# Patient Record
Sex: FEMALE | Race: BLACK OR AFRICAN AMERICAN | Employment: FULL TIME | URBAN - METROPOLITAN AREA
[De-identification: names, ages, dates, MRNs, and addresses within clinical notes are randomized per-mention and may not be internally consistent; named-entity substitution may affect disease eponyms.]

---

## 2017-04-10 ENCOUNTER — OFFICE VISIT (OUTPATIENT)
Dept: FAMILY MEDICINE CLINIC | Age: 57
End: 2017-04-10

## 2017-04-10 VITALS
SYSTOLIC BLOOD PRESSURE: 130 MMHG | OXYGEN SATURATION: 96 % | DIASTOLIC BLOOD PRESSURE: 84 MMHG | HEIGHT: 71 IN | TEMPERATURE: 98.3 F | HEART RATE: 78 BPM | RESPIRATION RATE: 14 BRPM | WEIGHT: 254 LBS | BODY MASS INDEX: 35.56 KG/M2

## 2017-04-10 DIAGNOSIS — G89.29 CHRONIC PAIN OF LEFT KNEE: Primary | ICD-10-CM

## 2017-04-10 DIAGNOSIS — M25.562 CHRONIC PAIN OF LEFT KNEE: Primary | ICD-10-CM

## 2017-04-10 RX ORDER — DICLOFENAC SODIUM 75 MG/1
75 TABLET, DELAYED RELEASE ORAL 2 TIMES DAILY
Qty: 60 TAB | Refills: 5 | Status: SHIPPED | OUTPATIENT
Start: 2017-04-10 | End: 2017-06-21 | Stop reason: ALTCHOICE

## 2017-04-10 RX ORDER — TRAMADOL HYDROCHLORIDE AND ACETAMINOPHEN 37.5; 325 MG/1; MG/1
TABLET ORAL
Qty: 50 TAB | Refills: 3 | Status: SHIPPED | OUTPATIENT
Start: 2017-04-10 | End: 2017-09-05 | Stop reason: SDUPTHER

## 2017-04-10 RX ORDER — METHYLPREDNISOLONE ACETATE 40 MG/ML
40 INJECTION, SUSPENSION INTRA-ARTICULAR; INTRALESIONAL; INTRAMUSCULAR; SOFT TISSUE ONCE
Qty: 1 ML | Refills: 0
Start: 2017-04-10 | End: 2017-04-10

## 2017-04-10 NOTE — PROGRESS NOTES
Chief Complaint   Patient presents with    Knee Pain     Left knee. . pain gets up to a \"5\"   \"feels like fluid on the knee, it's tight\"     1. Have you been to the ER, urgent care clinic since your last visit? Hospitalized since your last visit? No    2. Have you seen or consulted any other health care providers outside of the [address] [place] [address] since your last visit? Include any pap smears or colon screening. No     Health Maintenance Due   Topic Date Due    Hepatitis C Screening  1960       [facility]  OFFICE PROCEDURE PROGRESS NOTE        Chart reviewed for the following:   Travis Núñez LPN, have reviewed the History, Physical and updated the Allergic reactions for [address] [place] [address] performed immediately prior to start of procedure:   Heide KAMINSKI LPN, have performed the following reviews on [facility] prior to the start of the procedure:            * Patient was identified by name and date of birth   * Agreement on procedure being performed was verified  * Risks and Benefits explained to the patient  * Procedure site verified and marked as necessary  * Patient was positioned for comfort  * Consent was signed and verified     Time: 11:31am      Date of procedure: 4/10/2017    Procedure performed by:  Jamar Jackson MD    Provider assisted by: Yasemin Maurice LPN    Patient assisted by: Yasemin Maurice LPN    How tolerated by patient:  Pt tolerated procedure well.     Post Procedural Pain Scale:  Pt had a pain of \"0\" after procedure   Comments: None

## 2017-04-10 NOTE — MR AVS SNAPSHOT
Visit Information Date & Time Provider Department Dept. Phone Encounter #  
 4/10/2017  9:45 AM Fly Mahoney MD [facility] [phone] [id number] Upcoming Health Maintenance Date Due Hepatitis C Screening 1960 PAP AKA CERVICAL CYTOLOGY 4/22/2018 BREAST CANCER SCRN MAMMOGRAM 10/7/2018 COLONOSCOPY 4/25/2024 DTaP/Tdap/Td series (2 - Td) 6/20/2026 Allergies as of 4/10/2017  Review Complete On: 4/10/2017 By: Nighat Nevarez LPN Severity Noted Reaction Type Reactions Nuts [Tree Nut]  04/25/2016    Shortness of Breath Brazil nuts cause asthma type symptoms Current Immunizations  Reviewed on 10/14/2015 Name Date Influenza Vaccine (Quad) PF 9/26/2016, 10/14/2015, 1/22/2015 Influenza Vaccine PF 4/2/2014 Tdap 6/20/2016 Not reviewed this visit You Were Diagnosed With   
  
 Codes Comments Chronic pain of left knee    -  Primary ICD-10-CM: M25.562, K86.80 ICD-9-CM: 719.46, 338.29 Vitals BP Pulse Temp Resp Height(growth percentile) Weight(growth percentile) 130/84 78 98.3 °F (36.8 °C) (Oral) 14 5' 11\" (1.803 m) 254 lb (115.2 kg) LMP SpO2 BMI OB Status Smoking Status 01/13/2012 96% 35.43 kg/m2 Postmenopausal Never Smoker Vitals History BMI and BSA Data Body Mass Index Body Surface Area  
 35.43 kg/m 2 2.4 m 2 Preferred Pharmacy Pharmacy Name Phone [facility]/PHARMACY #[id number]- [place], [place] - [address] [phone] Your Updated Medication List  
  
   
This list is accurate as of: 4/10/17 11:03 AM.  Always use your most recent med list.  
  
  
  
  
 diclofenac EC 75 mg EC tablet Commonly known as:  VOLTAREN Take 1 Tab by mouth two (2) times a day. For arthritis in knee  
  
 levothyroxine 100 mcg tablet Commonly known as:  SYNTHROID  
TAKE 1 TAB BY MOUTH DAILY (BEFORE BREAKFAST). methylPREDNISolone acetate 40 mg/mL injection Commonly known as:  DEPO-Medrol 1 mL by IntraMUSCular route once for 1 dose. MULTIVITAMIN PO Take  by mouth as needed. naltrexone-buPROPion 8-90 mg Tber ER tablet Commonly known as:  Ele Devine Week 1 1 tab PO QAM, Week 2 1QAM 1QHS, Week 3 2QAM 1 QHS, Week 4 & beyond 2QAM 2QHS PROAIR HFA 90 mcg/actuation inhaler Generic drug:  albuterol Take 2 Puffs by inhalation every four (4) hours as needed for Wheezing. traMADol-acetaminophen 37.5-325 mg per tablet Commonly known as:  ULTRACET  
1-2 tabs every 6 hours as needed for knee pain Prescriptions Printed Refills  
 traMADol-acetaminophen (ULTRACET) 37.5-325 mg per tablet 3 Si-2 tabs every 6 hours as needed for knee pain  
 Class: Print  
 naltrexone-buPROPion (CONTRAVE) 8-90 mg TbER ER tablet 5 Sig: Week 1 1 tab PO QAM, Week 2 1QAM 1QHS, Week 3 2QAM 1 QHS, Week 4 & beyond 2QAM 2QHS Class: Print Prescriptions Sent to Pharmacy Refills  
 diclofenac EC (VOLTAREN) 75 mg EC tablet 5 Sig: Take 1 Tab by mouth two (2) times a day. For arthritis in knee Class: Normal  
 Pharmacy: [facility]/pharmacy [address] [place] [address] [address] #: [phone] Route: Oral  
  
We Performed the Following METHYLPREDNISOLONE ACETATE INJECTION 40 MG [ [facility]] Comments:  
 Submit quantity per 40 mg injection ID DRAIN/INJECT LARGE JOINT/BURSA [id number] CPT(R)] [facility] [place] [facility]! Dear Lexi Mas: Thank you for requesting a [employer] account. Our records indicate that you already have an active [employer] account. You can access your account anytime at [email or web address] Did you know that you can access your hospital and ER discharge instructions at any time in [employer]? You can also review all of your test results from your hospital stay or ER visit. Additional Information If you have questions, please visit the Frequently Asked Questions section of the [employer] website at [email or web address] Remember, [email or web address] is NOT to be used for urgent needs. For medical emergencies, dial 911. Now available from your iPhone and Android! Please provide this summary of care documentation to your next provider. Your primary care clinician is listed as Carlie De La O. If you have any questions after today's visit, please call [phone].

## 2017-04-10 NOTE — PROGRESS NOTES
HISTORY OF PRESENT ILLNESS  Debbie Barrera is a 56 y.o. female. HPI Has had pain in L knee for one year, has been getting progressively worse in last 6 months. Had fluid drained from L knee in December. ROS    Physical Exam   Constitutional: She is oriented to person, place, and time. She appears well-developed and well-nourished. Neck: No thyromegaly present. Cardiovascular: Normal rate, regular rhythm and normal heart sounds. No murmur heard. Pulmonary/Chest: Effort normal and breath sounds normal. She has no wheezes. Abdominal: Soft. Bowel sounds are normal. She exhibits no distension. There is no tenderness. There is no rebound and no guarding. Musculoskeletal: Normal range of motion. She exhibits no edema. Lknee- small effusion present   Lymphadenopathy:     She has no cervical adenopathy. Neurological: She is alert and oriented to person, place, and time. Nursing note and vitals reviewed. ASSESSMENT and PLAN  Orders Placed This Encounter    (DEPO-MEDROL 40 mg  -   quantity 1 for Reimbursement) METHYLPREDNISOLONE ACETATE 40 mg injection    20610 - DRAIN/INJECT LARGE JOINT/BURSA    diclofenac EC (VOLTAREN) 75 mg EC tablet    traMADol-acetaminophen (ULTRACET) 37.5-325 mg per tablet    naltrexone-buPROPion (CONTRAVE) 8-90 mg TbER ER tablet    methylPREDNISolone acetate (DEPO-MEDROL) 40 mg/mL injection     Brandi Alves was seen today for knee pain. Diagnoses and all orders for this visit:    Chronic pain of left knee  -     methylPREDNISolone acetate (DEPO-MEDROL) 40 mg/mL injection; 1 mL by IntraMUSCular route once for 1 dose. -     (DEPO-MEDROL 40 mg  -   quantity 1 for Reimbursement) METHYLPREDNISOLONE ACETATE 40 mg injection  -     20610 - DRAIN/INJECT LARGE JOINT/BURSA    Other orders  -     diclofenac EC (VOLTAREN) 75 mg EC tablet; Take 1 Tab by mouth two (2) times a day.  For arthritis in knee  -     traMADol-acetaminophen (ULTRACET) 37.5-325 mg per tablet; 1-2 tabs every 6 hours as needed for knee pain  -     naltrexone-buPROPion (CONTRAVE) 8-90 mg TbER ER tablet; Week 1 1 tab PO QAM, Week 2 1QAM 1QHS, Week 3 2QAM 1 QHS, Week 4 & beyond 2QAM 2QHS      Follow-up Disposition: Not on File

## 2017-04-28 ENCOUNTER — TELEPHONE (OUTPATIENT)
Dept: FAMILY MEDICINE CLINIC | Age: 57
End: 2017-04-28

## 2017-06-21 ENCOUNTER — OFFICE VISIT (OUTPATIENT)
Dept: FAMILY MEDICINE CLINIC | Age: 57
End: 2017-06-21

## 2017-06-21 VITALS
BODY MASS INDEX: 35.36 KG/M2 | HEIGHT: 71 IN | DIASTOLIC BLOOD PRESSURE: 88 MMHG | WEIGHT: 252.6 LBS | OXYGEN SATURATION: 91 % | RESPIRATION RATE: 16 BRPM | SYSTOLIC BLOOD PRESSURE: 144 MMHG | HEART RATE: 88 BPM | TEMPERATURE: 99.1 F

## 2017-06-21 DIAGNOSIS — G89.29 CHRONIC PAIN OF LEFT KNEE: Primary | ICD-10-CM

## 2017-06-21 DIAGNOSIS — M25.562 CHRONIC PAIN OF LEFT KNEE: Primary | ICD-10-CM

## 2017-06-21 RX ORDER — PIROXICAM 20 MG/1
20 CAPSULE ORAL DAILY
Qty: 30 CAP | Refills: 5 | Status: SHIPPED | OUTPATIENT
Start: 2017-06-21 | End: 2017-09-01 | Stop reason: ALTCHOICE

## 2017-06-21 RX ORDER — PHENTERMINE HYDROCHLORIDE 30 MG/1
30 CAPSULE ORAL
Qty: 30 CAP | Refills: 3 | Status: SHIPPED | OUTPATIENT
Start: 2017-06-21 | End: 2017-12-12

## 2017-06-21 NOTE — PROGRESS NOTES
Chief Complaint   Patient presents with    Knee Pain     left     Patient here for continued left knee pain. No relief.

## 2017-06-21 NOTE — MR AVS SNAPSHOT
Visit Information Date & Time Provider Department Dept. Phone Encounter #  
 6/21/2017  8:30 AM Becca Miller MD [facility] [phone] [id number] Upcoming Health Maintenance Date Due Hepatitis C Screening 1960 INFLUENZA AGE 9 TO ADULT 8/1/2017 PAP AKA CERVICAL CYTOLOGY 4/22/2018 BREAST CANCER SCRN MAMMOGRAM 10/7/2018 COLONOSCOPY 4/25/2024 DTaP/Tdap/Td series (2 - Td) 6/20/2026 Allergies as of 6/21/2017  Review Complete On: 6/21/2017 By: Becca Miller MD  
  
 Severity Noted Reaction Type Reactions Nuts [Tree Nut]  04/25/2016    Shortness of Breath Brazil nuts cause asthma type symptoms Current Immunizations  Reviewed on 10/14/2015 Name Date Influenza Vaccine (Quad) PF 9/26/2016, 10/14/2015, 1/22/2015 Influenza Vaccine PF 4/2/2014 Tdap 6/20/2016 Not reviewed this visit Vitals BP Pulse Temp Resp Height(growth percentile) Weight(growth percentile) 144/88 (BP 1 Location: Left arm, BP Patient Position: Sitting) 88 99.1 °F (37.3 °C) (Oral) 16 5' 11\" (1.803 m) 252 lb 9.6 oz (114.6 kg) LMP SpO2 BMI OB Status Smoking Status 01/13/2012 91% 35.23 kg/m2 Postmenopausal Never Smoker Vitals History BMI and BSA Data Body Mass Index Body Surface Area  
 35.23 kg/m 2 2.4 m 2 Preferred Pharmacy Pharmacy Name Phone [facility]/PHARMACY #[id number], [place] - [address] [phone] Your Updated Medication List  
  
   
This list is accurate as of: 6/21/17  9:32 AM.  Always use your most recent med list.  
  
  
  
  
 levothyroxine 100 mcg tablet Commonly known as:  SYNTHROID  
TAKE 1 TAB BY MOUTH DAILY (BEFORE BREAKFAST). MULTIVITAMIN PO Take  by mouth as needed. phentermine 30 mg capsule Take 1 Cap by mouth every morning. Max Daily Amount: 30 mg. For weight loss  
  
 piroxicam 20 mg capsule Commonly known as:  Dominic Bloch  
 Take 1 Cap by mouth daily. For arthritis PROAIR HFA 90 mcg/actuation inhaler Generic drug:  albuterol Take 2 Puffs by inhalation every four (4) hours as needed for Wheezing. traMADol-acetaminophen 37.5-325 mg per tablet Commonly known as:  ULTRACET  
1-2 tabs every 6 hours as needed for knee pain Prescriptions Printed Refills  
 phentermine 30 mg capsule 3 Sig: Take 1 Cap by mouth every morning. Max Daily Amount: 30 mg. For weight loss Class: Print Route: Oral  
  
Prescriptions Sent to Pharmacy Refills  
 piroxicam (FELDENE) 20 mg capsule 5 Sig: Take 1 Cap by mouth daily. For arthritis Class: Normal  
 Pharmacy: [facility]/pharmacy [address] [facility] [address] Ph #: [phone] Route: Oral  
  
Introducing [facility] SERVICES! Dear Santo Yusuf: Thank you for requesting a [employer] account. Our records indicate that you already have an active [employer] account. You can access your account anytime at [email or web address] Did you know that you can access your hospital and ER discharge instructions at any time in [employer]? You can also review all of your test results from your hospital stay or ER visit. Additional Information If you have questions, please visit the Frequently Asked Questions section of the [employer] website at [email or web address] Remember, [employer] is NOT to be used for urgent needs. For medical emergencies, dial 911. Now available from your iPhone and Android! Please provide this summary of care documentation to your next provider. Your primary care clinician is listed as Can Olivo. If you have any questions after today's visit, please call [phone].

## 2017-06-21 NOTE — PROGRESS NOTES
HISTORY OF PRESENT ILLNESS  Stanley Garcia is a 56 y.o. female. HPI Continues to have L knee pain (for one year now), and also having pain L gluteal area for one month. Diclofenac hasnt helped either pain. Knee pain is the worse, has difficulty walking to work. Knee pain gets worse when tries to get up and get moving. ROS    Physical Exam   Constitutional: She is oriented to person, place, and time. She appears well-developed and well-nourished. Neck: No thyromegaly present. Cardiovascular: Normal rate, regular rhythm and normal heart sounds. No murmur heard. Pulmonary/Chest: Effort normal and breath sounds normal. She has no wheezes. Abdominal: Soft. Bowel sounds are normal. She exhibits no distension. There is no tenderness. There is no rebound and no guarding. Musculoskeletal: Normal range of motion. She exhibits no edema. L knee- mild crepitus  L hip- full rom  SLR negative on L    Lymphadenopathy:     She has no cervical adenopathy. Neurological: She is alert and oriented to person, place, and time. Nursing note and vitals reviewed. ASSESSMENT and PLAN  Orders Placed This Encounter    phentermine 30 mg capsule    piroxicam (FELDENE) 20 mg capsule     Yovani Espino was seen today for knee pain. Diagnoses and all orders for this visit:    Chronic pain of left knee    Other orders  -     phentermine 30 mg capsule; Take 1 Cap by mouth every morning. Max Daily Amount: 30 mg. For weight loss  -     piroxicam (FELDENE) 20 mg capsule; Take 1 Cap by mouth daily.  For arthritis      Follow-up Disposition: Not on File

## 2017-08-17 RX ORDER — LEVOTHYROXINE SODIUM 100 UG/1
TABLET ORAL
Qty: 90 TAB | Refills: 2 | Status: SHIPPED | OUTPATIENT
Start: 2017-08-17 | End: 2018-09-04 | Stop reason: SDUPTHER

## 2017-09-01 ENCOUNTER — OFFICE VISIT (OUTPATIENT)
Dept: FAMILY MEDICINE CLINIC | Age: 57
End: 2017-09-01

## 2017-09-01 VITALS
DIASTOLIC BLOOD PRESSURE: 90 MMHG | RESPIRATION RATE: 14 BRPM | TEMPERATURE: 98.6 F | HEART RATE: 95 BPM | OXYGEN SATURATION: 96 % | SYSTOLIC BLOOD PRESSURE: 142 MMHG | WEIGHT: 240.8 LBS | HEIGHT: 71 IN | BODY MASS INDEX: 33.71 KG/M2

## 2017-09-01 DIAGNOSIS — Z23 ENCOUNTER FOR IMMUNIZATION: Primary | ICD-10-CM

## 2017-09-01 DIAGNOSIS — M25.562 LATERAL KNEE PAIN, LEFT: ICD-10-CM

## 2017-09-01 RX ORDER — OXAPROZIN 600 MG/1
1200 TABLET, FILM COATED ORAL DAILY
Qty: 60 TAB | Refills: 12 | Status: SHIPPED | OUTPATIENT
Start: 2017-09-01 | End: 2017-12-12

## 2017-09-01 NOTE — PATIENT INSTRUCTIONS
Vaccine Information Statement    Influenza (Flu) Vaccine (Inactivated or Recombinant): What you need to know    Many Vaccine Information Statements are available in Hungarian and other languages. See www.immunize.org/vis  Hojas de Información Sobre Vacunas están disponibles en Español y en muchos otros idiomas. Visite www.immunize.org/vis    1. Why get vaccinated? Influenza (flu) is a contagious disease that spreads around the [place] every year, usually between October and May. Flu is caused by influenza viruses, and is spread mainly by coughing, sneezing, and close contact. Anyone can get flu. Flu strikes suddenly and can last several days. Symptoms vary by age, but can include:   fever/chills   sore throat   muscle aches   fatigue   cough   headache    runny or stuffy nose    Flu can also lead to pneumonia and blood infections, and cause diarrhea and seizures in children. If you have a medical condition, such as heart or lung disease, flu can make it worse. Flu is more dangerous for some people. Infants and young children, people 72years of age and older, pregnant women, and people with certain health conditions or a weakened immune system are at greatest risk. Each year thousands of people in the [facility] die from flu, and many more are hospitalized. Flu vaccine can:   keep you from getting flu,   make flu less severe if you do get it, and   keep you from spreading flu to your family and other people. 2. Inactivated and recombinant flu vaccines    A dose of flu vaccine is recommended every flu season. Children 6 months through 6years of age may need two doses during the same flu season. Everyone else needs only one dose each flu season.        Some inactivated flu vaccines contain a very small amount of a mercury-based preservative called thimerosal. Studies have not shown thimerosal in vaccines to be harmful, but flu vaccines that do not contain thimerosal are available. There is no live flu virus in flu shots. They cannot cause the flu. There are many flu viruses, and they are always changing. Each year a new flu vaccine is made to protect against three or four viruses that are likely to cause disease in the upcoming flu season. But even when the vaccine doesnt exactly match these viruses, it may still provide some protection    Flu vaccine cannot prevent:   flu that is caused by a virus not covered by the vaccine, or   illnesses that look like flu but are not. It takes about 2 weeks for protection to develop after vaccination, and protection lasts through the flu season. 3. Some people should not get this vaccine    Tell the person who is giving you the vaccine:     If you have any severe, life-threatening allergies. If you ever had a life-threatening allergic reaction after a dose of flu vaccine, or have a severe allergy to any part of this vaccine, you may be advised not to get vaccinated. Most, but not all, types of flu vaccine contain a small amount of egg protein.  If you ever had Guillain-Barré Syndrome (also called GBS). Some people with a history of GBS should not get this vaccine. This should be discussed with your doctor.  If you are not feeling well. It is usually okay to get flu vaccine when you have a mild illness, but you might be asked to come back when you feel better. 4. Risks of a vaccine reaction    With any medicine, including vaccines, there is a chance of reactions. These are usually mild and go away on their own, but serious reactions are also possible. Most people who get a flu shot do not have any problems with it.      Minor problems following a flu shot include:    soreness, redness, or swelling where the shot was given     hoarseness   sore, red or itchy eyes   cough   fever   aches   headache   itching   fatigue  If these problems occur, they usually begin soon after the shot and last 1 or 2 days. More serious problems following a flu shot can include the following:     There may be a small increased risk of Guillain-Barré Syndrome (GBS) after inactivated flu vaccine. This risk has been estimated at 1 or 2 additional cases per million people vaccinated. This is much lower than the risk of severe complications from flu, which can be prevented by flu vaccine.  Young children who get the flu shot along with pneumococcal vaccine (PCV13) and/or DTaP vaccine at the same time might be slightly more likely to have a seizure caused by fever. Ask your doctor for more information. Tell your doctor if a child who is getting flu vaccine has ever had a seizure. Problems that could happen after any injected vaccine:      People sometimes faint after a medical procedure, including vaccination. Sitting or lying down for about 15 minutes can help prevent fainting, and injuries caused by a fall. Tell your doctor if you feel dizzy, or have vision changes or ringing in the ears.  Some people get severe pain in the shoulder and have difficulty moving the arm where a shot was given. This happens very rarely.  Any medication can cause a severe allergic reaction. Such reactions from a vaccine are very rare, estimated at about 1 in a million doses, and would happen within a few minutes to a few hours after the vaccination. As with any medicine, there is a very remote chance of a vaccine causing a serious injury or death. The safety of vaccines is always being monitored. For more information, visit: www.cdc.gov/vaccinesafety/    5. What if there is a serious reaction? What should I look for?  Look for anything that concerns you, such as signs of a severe allergic reaction, very high fever, or unusual behavior.     Signs of a severe allergic reaction can include hives, swelling of the face and throat, difficulty breathing, a fast heartbeat, dizziness, and weakness  usually within a few minutes to a few hours after the vaccination. What should I do?  If you think it is a severe allergic reaction or other emergency that cant wait, call 9-1-1 and get the person to the nearest hospital. Otherwise, call your doctor.  Reactions should be reported to the Vaccine Adverse Event Reporting System (VAERS). Your doctor should file this report, or you can do it yourself through  the VAERS web site at www.vaers. [facility].gov, or by calling 1-787.269.6082. VAERS does not give medical advice. 6. The National Vaccine Injury Compensation Program    The [facility] Vaccine Injury Compensation Program (VICP) is a federal program that was created to compensate people who may have been injured by certain vaccines. Persons who believe they may have been injured by a vaccine can learn about the program and about filing a claim by calling 0-509.469.5326 or visiting the [facility] website at www.[facility]/vaccinecompensation. There is a time limit to file a claim for compensation. 7. How can I learn more?  Ask your healthcare provider. He or she can give you the vaccine package insert or suggest other sources of information.  Call your local or state health department.  Contact the Centers for Disease Control and Prevention (CDC):  - Call 6-267.617.3763 (1-800-CDC-INFO) or  - Visit CDCs website at www.cdc.gov/flu    Vaccine Information Statement   Inactivated Influenza Vaccine   8/7/2015  42 JACOB Abernathya Harlan [id number]    Department of Health and Human Services  Centers for Disease Control and Prevention    Office Use Only

## 2017-09-01 NOTE — MR AVS SNAPSHOT
Visit Information Date & Time Provider Department Dept. Phone Encounter #  
 9/1/2017  9:15 AM Lisa Payne MD [facility] [phone] [id number] Upcoming Health Maintenance Date Due Hepatitis C Screening 1960 INFLUENZA AGE 9 TO ADULT 8/1/2017 PAP AKA CERVICAL CYTOLOGY 4/22/2018 BREAST CANCER SCRN MAMMOGRAM 10/7/2018 COLONOSCOPY 4/25/2024 DTaP/Tdap/Td series (2 - Td) 6/20/2026 Allergies as of 9/1/2017  Review Complete On: 9/1/2017 By: Lisa Payne MD  
  
 Severity Noted Reaction Type Reactions Nuts [Tree Nut]  04/25/2016    Shortness of Breath Brazil nuts cause asthma type symptoms Current Immunizations  Reviewed on 10/14/2015 Name Date Influenza Vaccine (Quad) PF  Incomplete, 9/26/2016, 10/14/2015, 1/22/2015 Influenza Vaccine PF 4/2/2014 Tdap 6/20/2016 Not reviewed this visit You Were Diagnosed With   
  
 Codes Comments Encounter for immunization    -  Primary ICD-10-CM: Q33 ICD-9-CM: V03.89 Lateral knee pain, left     ICD-10-CM: M25.562 ICD-9-CM: 719.46 Vitals BP Pulse Temp Resp Height(growth percentile) Weight(growth percentile) 142/90 95 98.6 °F (37 °C) (Oral) 14 5' 11\" (1.803 m) 240 lb 12.8 oz (109.2 kg) LMP SpO2 BMI OB Status Smoking Status 01/13/2012 96% 33.58 kg/m2 Postmenopausal Never Smoker Vitals History BMI and BSA Data Body Mass Index Body Surface Area 33.58 kg/m 2 2.34 m 2 Preferred Pharmacy Pharmacy Name Phone [facility]ACY #[id number]- [facility] [address] [phone] Your Updated Medication List  
  
   
This list is accurate as of: 9/1/17  9:45 AM.  Always use your most recent med list.  
  
  
  
  
 levothyroxine 100 mcg tablet Commonly known as:  SYNTHROID  
TAKE 1 TAB BY MOUTH DAILY (BEFORE BREAKFAST). MULTIVITAMIN PO Take  by mouth as needed. oxaprozin 600 mg tablet Commonly known as:  Chales Moors Take 2 Tabs by mouth daily. For arthritis. phentermine 30 mg capsule Take 1 Cap by mouth every morning. Max Daily Amount: 30 mg. For weight loss PROAIR HFA 90 mcg/actuation inhaler Generic drug:  albuterol Take 2 Puffs by inhalation every four (4) hours as needed for Wheezing. traMADol-acetaminophen 37.5-325 mg per tablet Commonly known as:  ULTRACET  
1-2 tabs every 6 hours as needed for knee pain  
  
 varicella zoster vacine live 19,400 unit/0.65 mL Susr injection Commonly known as:  varicella-zoster vacine live 1 Vial by SubCUTAneous route once for 1 dose. Prescriptions Printed Refills  
 varicella zoster vacine live (VARICELLA-ZOSTER VACINE LIVE) 19,400 unit/0.65 mL susr injection 0 Si Vial by SubCUTAneous route once for 1 dose. Class: Print Route: SubCUTAneous Prescriptions Sent to Pharmacy Refills  
 oxaprozin (DAYPRO) 600 mg tablet 12 Sig: Take 2 Tabs by mouth daily. For arthritis. Class: Normal  
 Pharmacy: [facility]/pharmacy [address] [place] [address] [address] [facility] #: [phone] Route: Oral  
  
We Performed the Following INFLUENZA VIRUS VAC QUAD,SPLIT,PRESV FREE SYRINGE 3/> YRS IM [id number] CPT(R)] MS IMMUNIZ ADMIN,1 SINGLE/COMB VAC/TOXOID [id number] CPT(R)] Patient Instructions Vaccine Information Statement Influenza (Flu) Vaccine (Inactivated or Recombinant): What you need to know Many Vaccine Information Statements are available in Marshallese and other languages. See www.immunize.org/vis Hojas de Información Sobre Vacunas están disponibles en Español y en muchos otros idiomas. Visite www.immunize.org/vis 1. Why get vaccinated? Influenza (flu) is a contagious disease that spreads around the [place] every year, usually between October and May. Flu is caused by influenza viruses, and is spread mainly by coughing, sneezing, and close contact. Anyone can get flu. Flu strikes suddenly and can last several days. Symptoms vary by age, but can include: 
 fever/chills  sore throat  muscle aches  fatigue  cough  headache  runny or stuffy nose Flu can also lead to pneumonia and blood infections, and cause diarrhea and seizures in children. If you have a medical condition, such as heart or lung disease, flu can make it worse. Flu is more dangerous for some people. Infants and young children, people 72years of age and older, pregnant women, and people with certain health conditions or a weakened immune system are at greatest risk. Each year thousands of people in the [facility] die from flu, and many more are hospitalized. Flu vaccine can: 
 keep you from getting flu, 
 make flu less severe if you do get it, and 
 keep you from spreading flu to your family and other people. 2. Inactivated and recombinant flu vaccines A dose of flu vaccine is recommended every flu season. Children 6 months through 6years of age may need two doses during the same flu season. Everyone else needs only one dose each flu season. Some inactivated flu vaccines contain a very small amount of a mercury-based preservative called thimerosal. Studies have not shown thimerosal in vaccines to be harmful, but flu vaccines that do not contain thimerosal are available. There is no live flu virus in flu shots. They cannot cause the flu. There are many flu viruses, and they are always changing. Each year a new flu vaccine is made to protect against three or four viruses that are likely to cause disease in the upcoming flu season. But even when the vaccine doesnt exactly match these viruses, it may still provide some protection Flu vaccine cannot prevent: 
 flu that is caused by a virus not covered by the vaccine, or 
  illnesses that look like flu but are not. It takes about 2 weeks for protection to develop after vaccination, and protection lasts through the flu season. 3. Some people should not get this vaccine Tell the person who is giving you the vaccine:  If you have any severe, life-threatening allergies. If you ever had a life-threatening allergic reaction after a dose of flu vaccine, or have a severe allergy to any part of this vaccine, you may be advised not to get vaccinated. Most, but not all, types of flu vaccine contain a small amount of egg protein.  If you ever had Guillain-Barré Syndrome (also called GBS). Some people with a history of GBS should not get this vaccine. This should be discussed with your doctor.  If you are not feeling well. It is usually okay to get flu vaccine when you have a mild illness, but you might be asked to come back when you feel better. 4. Risks of a vaccine reaction With any medicine, including vaccines, there is a chance of reactions. These are usually mild and go away on their own, but serious reactions are also possible. Most people who get a flu shot do not have any problems with it. Minor problems following a flu shot include:  
 soreness, redness, or swelling where the shot was given  hoarseness  sore, red or itchy eyes  cough  fever  aches  headache  itching  fatigue If these problems occur, they usually begin soon after the shot and last 1 or 2 days. More serious problems following a flu shot can include the following:  There may be a small increased risk of Guillain-Barré Syndrome (GBS) after inactivated flu vaccine. This risk has been estimated at 1 or 2 additional cases per million people vaccinated. This is much lower than the risk of severe complications from flu, which can be prevented by flu vaccine.    
 
 Young children who get the flu shot along with pneumococcal vaccine (PCV13) and/or DTaP vaccine at the same time might be slightly more likely to have a seizure caused by fever. Ask your doctor for more information. Tell your doctor if a child who is getting flu vaccine has ever had a seizure. Problems that could happen after any injected vaccine:  People sometimes faint after a medical procedure, including vaccination. Sitting or lying down for about 15 minutes can help prevent fainting, and injuries caused by a fall. Tell your doctor if you feel dizzy, or have vision changes or ringing in the ears.  Some people get severe pain in the shoulder and have difficulty moving the arm where a shot was given. This happens very rarely.  Any medication can cause a severe allergic reaction. Such reactions from a vaccine are very rare, estimated at about 1 in a million doses, and would happen within a few minutes to a few hours after the vaccination. As with any medicine, there is a very remote chance of a vaccine causing a serious injury or death. The safety of vaccines is always being monitored. For more information, visit: www.cdc.gov/vaccinesafety/ 
 
 
The [facility] Vaccine Injury Compensation Program (VICP) is a federal program that was created to compensate people who may have been injured by certain vaccines. Persons who believe they may have been injured by a vaccine can learn about the program and about filing a claim by calling 6-776.364.5730 or visiting the [employer] website at www.[facility].gov/vaccinecompensation. There is a time limit to file a claim for compensation. 7. How can I learn more?  Ask your healthcare provider. He or she can give you the vaccine package insert or suggest other sources of information.  Call your local or state health department.  Contact the Centers for Disease Control and Prevention (CDC): 
- Call 7-538.189.7949 (1-800-CDC-INFO) or 
- Visit CDCs website at www.cdc.gov/flu Vaccine Information Statement Inactivated Influenza Vaccine 8/7/2015 
[address] Ivy Schmid [id number]-49 Department of [facility] and [employer] Centers for Disease Control and Prevention Office Use Only Introducing [facility] & HEALTH SERVICES! Dear Susannah Rodriguez: Thank you for requesting a Flixwagon account. Our records indicate that you already have an active Flixwagon account. You can access your account anytime at [email or web address] Did you know that you can access your hospital and ER discharge instructions at any time in Flixwagon? You can also review all of your test results from your hospital stay or ER visit. Additional Information If you have questions, please visit the Frequently Asked Questions section of the Flixwagon website at [email or web address] Remember, Flixwagon is NOT to be used for urgent needs. For medical emergencies, dial 911. Now available from your iPhone and Android! Please provide this summary of care documentation to your next provider. Your primary care clinician is listed as Christine Velez. If you have any questions after today's visit, please call [phone].

## 2017-09-01 NOTE — PROGRESS NOTES
Chief Complaint   Patient presents with    Knee Pain     bilateral..  left worse than right   bilateral knees. .. pain increases when getting up    Hormone Problem     hotflashes     1. Have you been to the ER, urgent care clinic since your last visit? Hospitalized since your last visit? Asthma   [facility]    July 2017    2. Have you seen or consulted any other health care providers outside of the [facility] since your last visit? Include any pap smears or colon screening.  No     Health Maintenance Due   Topic Date Due    Hepatitis C Screening  1960    INFLUENZA AGE 9 TO ADULT  08/01/2017

## 2017-09-01 NOTE — PROGRESS NOTES
HISTORY OF PRESENT ILLNESS  Elayne Lesches is a 56 y.o. female. HPI Comes in with grandson, Lorena Gonzalez. Has been having lots of pains in knees, worse when gets up in am. Also feels a tightness behind L knee. Knees have been bothering him for one year. Has had fluid drained from L knee before. ROS    Physical Exam   Constitutional: She is oriented to person, place, and time. She appears well-developed and well-nourished. Neck: No thyromegaly present. Cardiovascular: Normal rate, regular rhythm and normal heart sounds. No murmur heard. Pulmonary/Chest: Effort normal and breath sounds normal. She has no wheezes. Abdominal: Soft. Bowel sounds are normal. She exhibits no distension. There is no tenderness. There is no rebound and no guarding. Musculoskeletal: Normal range of motion. She exhibits no edema. R knee- mild crepitus, no effusiojn  L knee- mild crepitus, no effusion or instability   Lymphadenopathy:     She has no cervical adenopathy. Neurological: She is alert and oriented to person, place, and time. Nursing note and vitals reviewed. ASSESSMENT and PLAN  Orders Placed This Encounter    Influenza virus vaccine (QUADRIVALENT PRES FREE SYRINGE) IM ([id number])    ND IMMUNIZ ADMIN,1 SINGLE/COMB VAC/TOXOID    oxaprozin (DAYPRO) 600 mg tablet    varicella zoster vacine live (VARICELLA-ZOSTER VACINE LIVE) 19,400 unit/0.65 mL susr injection     Diagnoses and all orders for this visit:    1. Encounter for immunization  -     Influenza virus vaccine (QUADRIVALENT PRES FREE SYRINGE) IM (43405)  -     ND IMMUNIZ ADMIN,1 SINGLE/COMB VAC/TOXOID    2. Lateral knee pain, left    Other orders  -     oxaprozin (DAYPRO) 600 mg tablet; Take 2 Tabs by mouth daily. For arthritis. -     varicella zoster vacine live (VARICELLA-ZOSTER VACINE LIVE) 19,400 unit/0.65 mL susr injection; 1 Vial by SubCUTAneous route once for 1 dose.       Follow-up Disposition: Not on File

## 2017-09-02 RX ORDER — ALBUTEROL SULFATE 90 UG/1
2 AEROSOL, METERED RESPIRATORY (INHALATION)
Qty: 3 INHALER | Refills: 4 | Status: SHIPPED | OUTPATIENT
Start: 2017-09-02 | End: 2017-12-12

## 2017-09-05 RX ORDER — TRAMADOL HYDROCHLORIDE AND ACETAMINOPHEN 37.5; 325 MG/1; MG/1
TABLET ORAL
Qty: 50 TAB | Refills: 3 | Status: SHIPPED | OUTPATIENT
Start: 2017-09-05 | End: 2017-12-12

## 2017-09-05 NOTE — TELEPHONE ENCOUNTER
----- Message from José Antonio Luna sent at 9/5/2017  3:06 PM EDT -----  Regarding: Dr. Maryann Sotelo, from [address], is requesting a renewal for the medication \"Tramadol\" for the pt. [facility] [phone] (reference [id number]); [phone].

## 2017-09-06 ENCOUNTER — TELEPHONE (OUTPATIENT)
Dept: FAMILY MEDICINE CLINIC | Age: 57
End: 2017-09-06

## 2017-10-20 ENCOUNTER — HOSPITAL ENCOUNTER (OUTPATIENT)
Dept: MAMMOGRAPHY | Age: 57
Discharge: HOME | End: 2017-10-20
Attending: OBSTETRICS & GYNECOLOGY
Payer: COMMERCIAL

## 2017-10-20 DIAGNOSIS — Z12.39 SCREENING BREAST EXAMINATION: ICD-10-CM

## 2017-10-20 PROCEDURE — 77067 SCR MAMMO BI INCL CAD: CPT

## 2017-12-12 ENCOUNTER — OFFICE VISIT (OUTPATIENT)
Dept: SURGERY | Age: 57
End: 2017-12-12

## 2017-12-12 VITALS
OXYGEN SATURATION: 96 % | HEIGHT: 71 IN | SYSTOLIC BLOOD PRESSURE: 152 MMHG | HEART RATE: 94 BPM | BODY MASS INDEX: 33.88 KG/M2 | WEIGHT: 242 LBS | TEMPERATURE: 98.4 F | DIASTOLIC BLOOD PRESSURE: 94 MMHG

## 2017-12-12 DIAGNOSIS — R23.2 HOT FLASHES: ICD-10-CM

## 2017-12-12 DIAGNOSIS — F51.01 PRIMARY INSOMNIA: ICD-10-CM

## 2017-12-12 DIAGNOSIS — Z01.419 WOMEN'S ANNUAL ROUTINE GYNECOLOGICAL EXAMINATION: Primary | ICD-10-CM

## 2017-12-12 RX ORDER — MELOXICAM 7.5 MG/1
TABLET ORAL
COMMUNITY
Start: 2017-12-07 | End: 2017-12-12

## 2017-12-12 RX ORDER — ZOLPIDEM TARTRATE 10 MG/1
10 TABLET ORAL
Qty: 30 TAB | Refills: 4 | Status: SHIPPED | OUTPATIENT
Start: 2017-12-12 | End: 2018-07-09 | Stop reason: SDUPTHER

## 2017-12-12 RX ORDER — VENLAFAXINE HYDROCHLORIDE 150 MG/1
150 CAPSULE, EXTENDED RELEASE ORAL DAILY
Qty: 30 CAP | Refills: 12 | Status: SHIPPED | OUTPATIENT
Start: 2017-12-12 | End: 2018-08-09 | Stop reason: ALTCHOICE

## 2017-12-12 NOTE — PROGRESS NOTES
SUBJECTIVE: Yue Bustos is a 56 y.o. female who presents with desire for annual well woman exam. Patient's last menstrual period was 2012. Allergies   Allergen Reactions    Nuts [Tree Nut] Shortness of Breath     [place] nuts cause asthma type symptoms        Past Medical History:   Diagnosis Date    Anxiety and depression     Asthma     Hyperthyroidism     s/p 32.1 mCi on 12 for toxic multinodular goiter    Other postablative hypothyroidism 2012     Past Surgical History:   Procedure Laterality Date    HX BUNIONECTOMY      bilateral    HX CHOLECYSTECTOMY      [facility]  16    [facility]    HX TONSILLECTOMY       OB History      Para Term  AB Living    3 2 2 0 1 2    SAB TAB Ectopic Molar Multiple Live Births    0 1 0  0         Family History   Problem Relation Age of Onset    Thyroid Disease Mother     Hypertension Mother      Social History     Social History    Marital status: SINGLE     Spouse name: N/A    Number of children: N/A    Years of education: N/A     Occupational History    Not on file. Social History Main Topics    Smoking status: Never Smoker    Smokeless tobacco: Never Used    Alcohol use No    Drug use: No    Sexual activity: Not Currently     Other Topics Concern    Not on file     Social History Narrative    Lives in [address] [place] [address] alone but daughter stays frequently. Also has 1 son. Works for [employer] [place]. Has a 3year old grandson that lives with her, and pts daughter, who is the mother                 Current Outpatient Prescriptions   Medication Sig Dispense Refill    levothyroxine (SYNTHROID) 100 mcg tablet TAKE 1 TAB BY MOUTH DAILY (BEFORE BREAKFAST). 90 Tab 2       Review of Systems:   Constitutional: No weight change, chills or fever, anorexia, weakness or sleep disturbance . Cardiovascular: No chest pain, shortness of breath, or palpitations .   Respiratory: No cough, shortness of breath, hemoptysis, or orthopnea . Neurologic: No syncope, headaches or seizures . Hematologic: No easy bruising or unusual bleeding . Psychiatric: No insomnia, confusion, depression, or anxiety . GI:No nausea and vomiting, diarrhea or constipation  . : See HPI . Musculoskeletal: No joint pain or muscle pain . Endocrine: No polydipsia, polyuria, cold intolerance, excessive fatigue, or sleep disturbance . Integumentary: No breast pain, lumps, nipple discharge, or axillary lumps . Objective:     Visit Vitals    BP (!) 152/94    Pulse 94    Temp 98.4 °F (36.9 °C) (Temporal)    Ht 5' 11\" (1.803 m)    Wt 242 lb (109.8 kg)    LMP 01/13/2012    SpO2 96%    BMI 33.75 kg/m2       General:  alert, cooperative, no distress, appears stated age   Skin:  no rash or abnormalities   Eyes: negative   Mouth: MMM no lesions   Lymph Nodes:  Cervical, supraclavicular, and axillary nodes normal.   Breast Exam: normal appearance, no masses or tenderness    Lungs:  clear to auscultation bilaterally   Heart:  regular rate and rhythm   Abdomen: soft, non-tender. Bowel sounds normal. No masses,  no organomegaly   Back:  Costovertebral angle tenderness absent   Genitourinary: Pelvic exam: Pelvic exam: VULVA: normal appearing vulva with no masses, tenderness or lesions, VAGINA: normal appearing vagina with normal color and discharge, no lesions, CERVIX: normal appearing cervix without discharge or lesions, UTERUS: uterus is normal size, shape, consistency and nontender, ADNEXA: normal adnexa in size, nontender and no masses. Extremities:  extremities normal, atraumatic, no cyanosis or edema   Neurologic:  negative   Psychiatric:  non focal     ASSESSMENT:      ICD-10-CM ICD-9-CM    1. Women's annual routine gynecological examination Z01.419 V72.31    2. Hot flashes R23.2 782.62 venlafaxine-SR (EFFEXOR-XR) 150 mg capsule   3.  Primary insomnia F51.01 307.42 zolpidem (AMBIEN) 10 mg tablet        Follow-up Disposition:  Return in about 1 year (around 12/12/2018), or if symptoms worsen or fail to improve.

## 2017-12-12 NOTE — MR AVS SNAPSHOT
Visit Information Date & Time Provider Department Dept. Phone Encounter #  
 12/12/2017  9:00 AM Olga Sullivan, [address] [facility] [address] [id number] Follow-up Instructions Return in about 1 year (around 12/12/2018), or if symptoms worsen or fail to improve. Upcoming Health Maintenance Date Due Hepatitis C Screening 1960 PAP AKA CERVICAL CYTOLOGY 4/22/2018 BREAST CANCER SCRN MAMMOGRAM 10/20/2019 COLONOSCOPY 4/25/2024 DTaP/Tdap/Td series (2 - Td) 6/20/2026 Allergies as of 12/12/2017  Review Complete On: 12/12/2017 By: Olga Sullivan MD  
  
 Severity Noted Reaction Type Reactions Nuts [Tree Nut]  04/25/2016    Shortness of Breath Brazil nuts cause asthma type symptoms Current Immunizations  Reviewed on 10/14/2015 Name Date Influenza Vaccine (Quad) PF 9/1/2017, 9/26/2016, 10/14/2015, 1/22/2015 Influenza Vaccine PF 4/2/2014 Tdap 6/20/2016 Not reviewed this visit You Were Diagnosed With   
  
 Codes Comments Women's annual routine gynecological examination    -  Primary ICD-10-CM: G26.793 ICD-9-CM: V72.31 Hot flashes     ICD-10-CM: R23.2 ICD-9-CM: 782.62 Primary insomnia     ICD-10-CM: F51.01 
ICD-9-CM: 307.42 Vitals BP Pulse Temp Height(growth percentile) Weight(growth percentile) LMP  
 (!) 152/94 94 98.4 °F (36.9 °C) (Temporal) 5' 11\" (1.803 m) 242 lb (109.8 kg) 01/13/2012 SpO2 BMI OB Status Smoking Status 96% 33.75 kg/m2 Postmenopausal Never Smoker Vitals History BMI and BSA Data Body Mass Index Body Surface Area 33.75 kg/m 2 2.35 m 2 [phone] Your Updated Medication List  
  
   
This list is accurate as of: 12/12/17  9:51 AM.  Always use your most recent med list.  
  
  
  
  
 levothyroxine 100 mcg tablet Commonly known as:  SYNTHROID  
TAKE 1 TAB BY MOUTH DAILY (BEFORE BREAKFAST). venlafaxine- mg capsule Commonly known as:  EFFEXOR-XR Take 1 Cap by mouth daily. zolpidem 10 mg tablet Commonly known as:  AMBIEN Take 1 Tab by mouth nightly as needed for Sleep. Max Daily Amount: 10 mg.  
  
  
  
  
Prescriptions Printed Refills  
 zolpidem (AMBIEN) 10 mg tablet 4 Sig: Take 1 Tab by mouth nightly as needed for Sleep. Max Daily Amount: 10 mg.  
 Class: Print Route: Oral  
  
Prescriptions Sent to Pharmacy Refills  
 venlafaxine-SR (EFFEXOR-XR) 150 mg capsule 12 Sig: Take 1 Cap by mouth daily. Class: Normal  
 Pharmacy: [address], [address] [place] [address] #: [phone] Route: Oral  
  
Follow-up Instructions Return in about 1 year (around 12/12/2018), or if symptoms worsen or fail to improve. Introducing [facility] & [facility] SERVICES! Dear Jared Pierre: Thank you for requesting a Anomo account. Our records indicate that you already have an active Anomo account. You can access your account anytime at [email or web address] Did you know that you can access your hospital and ER discharge instructions at any time in Anomo? You can also review all of your test results from your hospital stay or ER visit. Additional Information If you have questions, please visit the Frequently Asked Questions section of the Anomo website at [email or web address] Remember, Anomo is NOT to be used for urgent needs. For medical emergencies, dial 911. Now available from your iPhone and Android! Please provide this summary of care documentation to your next provider. Your primary care clinician is listed as Leonila Hernandez. If you have any questions after today's visit, please call [phone].

## 2018-02-08 ENCOUNTER — OFFICE VISIT (OUTPATIENT)
Dept: FAMILY MEDICINE CLINIC | Age: 58
End: 2018-02-08

## 2018-02-08 VITALS
SYSTOLIC BLOOD PRESSURE: 159 MMHG | WEIGHT: 241.6 LBS | DIASTOLIC BLOOD PRESSURE: 92 MMHG | HEART RATE: 87 BPM | HEIGHT: 71 IN | OXYGEN SATURATION: 96 % | TEMPERATURE: 97.9 F | BODY MASS INDEX: 33.82 KG/M2 | RESPIRATION RATE: 14 BRPM

## 2018-02-08 DIAGNOSIS — R10.11 RUQ ABDOMINAL PAIN: Primary | ICD-10-CM

## 2018-02-08 DIAGNOSIS — R07.9 CHEST PAIN AT REST: ICD-10-CM

## 2018-02-08 DIAGNOSIS — R25.2 LEG CRAMPS: ICD-10-CM

## 2018-02-08 LAB
BILIRUB UR QL STRIP: NEGATIVE
GLUCOSE UR-MCNC: NEGATIVE MG/DL
KETONES P FAST UR STRIP-MCNC: NEGATIVE MG/DL
PH UR STRIP: 6 [PH] (ref 4.6–8)
PROT UR QL STRIP: NEGATIVE
SP GR UR STRIP: 1.02 (ref 1–1.03)
UA UROBILINOGEN AMB POC: NORMAL (ref 0.2–1)
URINALYSIS CLARITY POC: CLEAR
URINALYSIS COLOR POC: YELLOW
URINE BLOOD POC: NEGATIVE
URINE LEUKOCYTES POC: NORMAL
URINE NITRITES POC: NEGATIVE

## 2018-02-08 RX ORDER — DIPHENHYDRAMINE HCL 25 MG
CAPSULE ORAL
Qty: 30 CAP | Refills: 5 | Status: SHIPPED | OUTPATIENT
Start: 2018-02-08 | End: 2019-06-24 | Stop reason: ALTCHOICE

## 2018-02-08 RX ORDER — MENTHOL
1000 GEL (GRAM) TOPICAL DAILY
Qty: 30 CAP | Refills: 12
Start: 2018-02-08

## 2018-02-08 RX ORDER — MELOXICAM 7.5 MG/1
TABLET ORAL DAILY
COMMUNITY
End: 2019-06-24 | Stop reason: ALTCHOICE

## 2018-02-08 NOTE — MR AVS SNAPSHOT
303 [facility] 
 
 
 [address] [place] [address] [phone] Patient: Narendra Adkins MRN: [id number] [address] [facility]:6/86/3635 Visit Information Date & Time Provider Department Dept. Phone Encounter #  
 2/8/2018 11:45 AM Ulysses Robles MD [facility] [phone] [id number] Your Appointments 12/18/2018  8:30 AM  
ROUTINE CARE with Corey Bermudez MD  
[facility] [address]) Appt Note: Well Woman $0CP  
 305 [facility]. [address] [address] [address] [place] [address] [address] [place] [address] Upcoming Health Maintenance Date Due Hepatitis C Screening 1960 PAP AKA CERVICAL CYTOLOGY 4/22/2018 BREAST CANCER SCRN MAMMOGRAM 10/20/2019 COLONOSCOPY 4/25/2024 DTaP/Tdap/Td series (2 - Td) 6/20/2026 Allergies as of 2/8/2018  Review Complete On: 2/8/2018 By: Rohan Cottrell LPN Severity Noted Reaction Type Reactions Nuts [Tree Nut]  04/25/2016    Shortness of Breath Brazil nuts cause asthma type symptoms Current Immunizations  Reviewed on 10/14/2015 Name Date Influenza Vaccine (Quad) PF 9/1/2017, 9/26/2016, 10/14/2015, 1/22/2015 Influenza Vaccine PF 4/2/2014 Tdap 6/20/2016 Not reviewed this visit You Were Diagnosed With   
  
 Codes Comments RUQ abdominal pain    -  Primary ICD-10-CM: R10.11 ICD-9-CM: 789.01 Leg cramps     ICD-10-CM: R25.2 ICD-9-CM: 729.82 Chest pain at rest     ICD-10-CM: R07.9 ICD-9-CM: 786.50 Vitals BP Pulse Temp Resp Height(growth percentile) Weight(growth percentile) (!) 159/92 87 97.9 °F (36.6 °C) (Oral) 14 5' 11\" (1.803 m) 241 lb 9.6 oz (109.6 kg) LMP SpO2 BMI OB Status Smoking Status 01/13/2012 96% 33.7 kg/m2 Postmenopausal Never Smoker Vitals History BMI and BSA Data Body Mass Index Body Surface Area 33.7 kg/m 2 2.34 m 2 Preferred Pharmacy Pharmacy Name Phone [address] [phone] Your Updated Medication List  
  
   
This list is accurate as of: 2/8/18  1:17 PM.  Always use your most recent med list.  
  
  
  
  
 diphenhydrAMINE 25 mg capsule Commonly known as:  BENADRYL One tab at bedtime as needed for leg cramps  
  
 levothyroxine 100 mcg tablet Commonly known as:  SYNTHROID  
TAKE 1 TAB BY MOUTH DAILY (BEFORE BREAKFAST). meloxicam 7.5 mg tablet Commonly known as:  MOBIC Take  by mouth daily. venlafaxine- mg capsule Commonly known as:  EFFEXOR-XR Take 1 Cap by mouth daily. vitamin e 1,000 unit capsule Commonly known as:  E GEMS Take 1 Cap by mouth daily. One at bedtime for leg cramps. zolpidem 10 mg tablet Commonly known as:  AMBIEN Take 1 Tab by mouth nightly as needed for Sleep. Max Daily Amount: 10 mg.  
  
  
  
  
Prescriptions Sent to Pharmacy Refills diphenhydrAMINE (BENADRYL) 25 mg capsule 5 Sig: One tab at bedtime as needed for leg cramps Class: Normal  
 Pharmacy: [address], [address] Ph #: [phone] We Performed the Following AMB POC EKG ROUTINE W/ 12 LEADS, INTER & REP [52588 CPT(R)] AMB POC URINALYSIS DIP STICK AUTO W/ MICRO [75610 CPT(R)] CBC WITH AUTOMATED DIFF [15196 CPT(R)] MAGNESIUM [id number] CPT(R)] METABOLIC PANEL, COMPREHENSIVE [27488 CPT(R)] REFERRAL TO CARDIOLOGY [EPE53 Custom] STRESS ECHO ICLUD PERFORM ECG ROSA WITH DR [91443 CPT(R)] To-Do List   
 02/08/2018 Imaging:  US ABD COMP   
  
 02/08/2018 Imaging:  XR CHEST PA LAT Referral Information Referral ID Referred By Referred To  
  
 [id number] Evan JOSHI MD   
   [id number] [address] [facility] Phone: [phone] Fax: [phone] Visits Status Start Date End Date 1 New Request 2/8/18 2/8/19 If your referral has a status of pending review or denied, additional information will be sent to support the outcome of this decision. Introducing [facility] & HEALTH SERVICES! Dear Alexa Ko: Thank you for requesting a AudioTag account. Our records indicate that you already have an active AudioTag account. You can access your account anytime at [email or web address] Did you know that you can access your hospital and ER discharge instructions at any time in AudioTag? You can also review all of your test results from your hospital stay or ER visit. Additional Information If you have questions, please visit the Frequently Asked Questions section of the AudioTag website at [email or web address] Remember, AudioTag is NOT to be used for urgent needs. For medical emergencies, dial 911. Now available from your iPhone and Android! Please provide this summary of care documentation to your next provider. Your primary care clinician is listed as Neal Longo. If you have any questions after today's visit, please call [phone].

## 2018-02-08 NOTE — PROGRESS NOTES
Chief Complaint   Patient presents with    Back Pain     pain at right shoulder plan  \"sharp pain\"    Abdominal Pain     RUQ     1. Have you been to the ER, urgent care clinic since your last visit? Hospitalized since your last visit? No    2. Have you seen or consulted any other health care providers outside of the [address] [place] [address] since your last visit? Include any pap smears or colon screening.  No     Health Maintenance Due   Topic Date Due    Hepatitis C Screening  1960    PAP AKA CERVICAL CYTOLOGY  04/22/2018     Pt refused getting   CPE

## 2018-02-08 NOTE — PROGRESS NOTES
HISTORY OF PRESENT ILLNESS  Natalie Callahan is a 56 y.o. female. HPI Has a pain in R posterior thoracic and R lower rib area, intermittently since Dec. Doesn't seem to be related to eating or movement. No cough. No hematuria. Not taking any meds for the pain. Pain not related to activity, no dyspnea. Also gets bad leg cramps at night, has tried tonic water, magnesium- no relief. Gets cramps in both thighs. Has had mammogram.   ROS    Physical Exam   Constitutional: She appears well-developed and well-nourished. HENT:   Right Ear: External ear normal.   Left Ear: External ear normal.   Mouth/Throat: Oropharynx is clear and moist.   Neck: No thyromegaly present. Cardiovascular: Normal rate, regular rhythm, normal heart sounds and intact distal pulses. Pulmonary/Chest: Breath sounds normal. She has no wheezes. Abdominal: Soft. Bowel sounds are normal. She exhibits no distension and no mass. There is no tenderness. Musculoskeletal: She exhibits no edema. No real tenderness to palpation   Lymphadenopathy:     She has no cervical adenopathy. Nursing note and vitals reviewed. ASSESSMENT and PLAN  Orders Placed This Encounter    XR CHEST PA LAT    US ABD COMP    CBC WITH AUTOMATED DIFF    METABOLIC PANEL, COMPREHENSIVE    MAGNESIUM    [facility]    STRESS ECHO ICLUD PERFORM ECG ROSA WITH DR Johnie WILKINS POC URINALYSIS DIP STICK AUTO W/ MICRO    AMB POC EKG ROUTINE W/ 12 LEADS, INTER & REP    vitamin e (E GEMS) 1,000 unit capsule    diphenhydrAMINE (BENADRYL) 25 mg capsule     Diagnoses and all orders for this visit:    1. RUQ abdominal pain  -     AMB POC EKG ROUTINE W/ 12 LEADS, INTER & REP  -     XR CHEST PA LAT; Future  -     AMB POC URINALYSIS DIP STICK AUTO W/ MICRO  -     CBC WITH AUTOMATED DIFF  -     METABOLIC PANEL, COMPREHENSIVE  -     MAGNESIUM  -     US ABD COMP; Future    2. Leg cramps    3.  Chest pain at rest  -     [address] Rene Mcneil [facility] ECG ROSA WITH     Other orders  -     vitamin e (E GEMS) 1,000 unit capsule; Take 1 Cap by mouth daily. One at bedtime for leg cramps. -     diphenhydrAMINE (BENADRYL) 25 mg capsule;  One tab at bedtime as needed for leg cramps      Follow-up Disposition: Not on File

## 2018-02-09 LAB
ALBUMIN SERPL-MCNC: 4.2 G/DL (ref 3.5–5.5)
ALBUMIN/GLOB SERPL: 1.4 {RATIO} (ref 1.2–2.2)
ALP SERPL-CCNC: 133 IU/L (ref 39–117)
ALT SERPL-CCNC: 13 IU/L (ref 0–32)
AST SERPL-CCNC: 8 IU/L (ref 0–40)
BASOPHILS # BLD AUTO: 0 X10E3/UL (ref 0–0.2)
BASOPHILS NFR BLD AUTO: 0 %
BILIRUB SERPL-MCNC: 0.3 MG/DL (ref 0–1.2)
BUN SERPL-MCNC: 14 MG/DL (ref 6–24)
BUN/CREAT SERPL: 14 (ref 9–23)
CALCIUM SERPL-MCNC: 9.4 MG/DL (ref 8.7–10.2)
CHLORIDE SERPL-SCNC: 100 MMOL/L (ref 96–106)
CO2 SERPL-SCNC: 26 MMOL/L (ref 18–29)
CREAT SERPL-MCNC: 0.97 MG/DL (ref 0.57–1)
EOSINOPHIL # BLD AUTO: 0.2 X10E3/UL (ref 0–0.4)
EOSINOPHIL NFR BLD AUTO: 3 %
ERYTHROCYTE [DISTWIDTH] IN BLOOD BY AUTOMATED COUNT: 13.5 % (ref 12.3–15.4)
GFR SERPLBLD CREATININE-BSD FMLA CKD-EPI: 66 ML/MIN/1.73
GFR SERPLBLD CREATININE-BSD FMLA CKD-EPI: 76 ML/MIN/1.73
GLOBULIN SER CALC-MCNC: 3 G/DL (ref 1.5–4.5)
GLUCOSE SERPL-MCNC: 102 MG/DL (ref 65–99)
HCT VFR BLD AUTO: 36.8 % (ref 34–46.6)
HGB BLD-MCNC: 12.8 G/DL (ref 11.1–15.9)
IMM GRANULOCYTES # BLD: 0 X10E3/UL (ref 0–0.1)
IMM GRANULOCYTES NFR BLD: 0 %
LYMPHOCYTES # BLD AUTO: 1.8 X10E3/UL (ref 0.7–3.1)
LYMPHOCYTES NFR BLD AUTO: 33 %
MAGNESIUM SERPL-MCNC: 2.2 MG/DL (ref 1.6–2.3)
MCH RBC QN AUTO: 28.4 PG (ref 26.6–33)
MCHC RBC AUTO-ENTMCNC: 34.8 G/DL (ref 31.5–35.7)
MCV RBC AUTO: 82 FL (ref 79–97)
MONOCYTES # BLD AUTO: 0.4 X10E3/UL (ref 0.1–0.9)
MONOCYTES NFR BLD AUTO: 8 %
NEUTROPHILS # BLD AUTO: 3.1 X10E3/UL (ref 1.4–7)
NEUTROPHILS NFR BLD AUTO: 56 %
PLATELET # BLD AUTO: 280 X10E3/UL (ref 150–379)
POTASSIUM SERPL-SCNC: 3.8 MMOL/L (ref 3.5–5.2)
PROT SERPL-MCNC: 7.2 G/DL (ref 6–8.5)
RBC # BLD AUTO: 4.51 X10E6/UL (ref 3.77–5.28)
SODIUM SERPL-SCNC: 143 MMOL/L (ref 134–144)
WBC # BLD AUTO: 5.5 X10E3/UL (ref 3.4–10.8)

## 2018-02-14 ENCOUNTER — HOSPITAL ENCOUNTER (OUTPATIENT)
Dept: ULTRASOUND IMAGING | Age: 58
Discharge: HOME | End: 2018-02-14
Attending: FAMILY MEDICINE
Payer: COMMERCIAL

## 2018-02-14 ENCOUNTER — HOSPITAL ENCOUNTER (OUTPATIENT)
Dept: GENERAL RADIOLOGY | Age: 58
Discharge: HOME | End: 2018-02-14
Attending: FAMILY MEDICINE
Payer: COMMERCIAL

## 2018-02-14 DIAGNOSIS — R10.11 RUQ ABDOMINAL PAIN: ICD-10-CM

## 2018-02-14 PROCEDURE — 71046 X-RAY EXAM CHEST 2 VIEWS: CPT

## 2018-02-14 PROCEDURE — 76700 US EXAM ABDOM COMPLETE: CPT

## 2018-02-23 ENCOUNTER — CLINICAL SUPPORT (OUTPATIENT)
Dept: CARDIOLOGY CLINIC | Age: 58
End: 2018-02-23

## 2018-02-23 DIAGNOSIS — R07.9 CHEST PAIN AT REST: Primary | ICD-10-CM

## 2018-02-23 NOTE — PROGRESS NOTES
Identified patient using full name and . Patient education for testing complete.  Patient verbalized understanding    Tillman Klinefelter, RN

## 2018-07-09 DIAGNOSIS — F51.01 PRIMARY INSOMNIA: ICD-10-CM

## 2018-07-16 RX ORDER — ZOLPIDEM TARTRATE 10 MG/1
TABLET ORAL
Qty: 30 TAB | Refills: 0 | OUTPATIENT
Start: 2018-07-16 | End: 2018-08-09 | Stop reason: SDUPTHER

## 2018-08-09 ENCOUNTER — OFFICE VISIT (OUTPATIENT)
Dept: FAMILY MEDICINE CLINIC | Age: 58
End: 2018-08-09

## 2018-08-09 VITALS
SYSTOLIC BLOOD PRESSURE: 137 MMHG | DIASTOLIC BLOOD PRESSURE: 89 MMHG | HEIGHT: 71 IN | OXYGEN SATURATION: 94 % | WEIGHT: 236.4 LBS | RESPIRATION RATE: 14 BRPM | BODY MASS INDEX: 33.1 KG/M2 | TEMPERATURE: 98.4 F | HEART RATE: 84 BPM

## 2018-08-09 DIAGNOSIS — Z11.59 NEED FOR HEPATITIS C SCREENING TEST: Primary | ICD-10-CM

## 2018-08-09 DIAGNOSIS — R25.2 LEG CRAMPS: ICD-10-CM

## 2018-08-09 DIAGNOSIS — E03.9 ACQUIRED HYPOTHYROIDISM: ICD-10-CM

## 2018-08-09 DIAGNOSIS — F51.01 PRIMARY INSOMNIA: ICD-10-CM

## 2018-08-09 RX ORDER — ZOLPIDEM TARTRATE 10 MG/1
TABLET ORAL
Qty: 30 TAB | Refills: 5 | Status: SHIPPED | OUTPATIENT
Start: 2018-08-09 | End: 2019-02-25 | Stop reason: SDUPTHER

## 2018-08-09 RX ORDER — QUININE SULFATE 324 MG/1
324 CAPSULE ORAL
Qty: 20 CAP | Refills: 1 | Status: SHIPPED | OUTPATIENT
Start: 2018-08-09 | End: 2018-09-08

## 2018-08-09 NOTE — PROGRESS NOTES
Chief Complaint   Patient presents with    Thyroid Problem    Other     leg cramps    Documentation     FMLA     1. Have you been to the ER, urgent care clinic since your last visit? Hospitalized since your last visit? No    2. Have you seen or consulted any other health care providers outside of the [address] [place] [address] since your last visit? Include any pap smears or colon screening.  No     Health Maintenance Due   Topic Date Due    Hepatitis C Screening  1960    PAP AKA CERVICAL CYTOLOGY  04/22/2018    Influenza Age 9 to Adult  08/01/2018

## 2018-08-09 NOTE — PROGRESS NOTES
HISTORY OF PRESENT ILLNESS  Josafat Manuel is a 66 y.o. female. HPI continues to have leg cramps, mainly at night. Has tried benadryl, vitamin E. Was helped by [facility] in the past. Used to take it prn when got cramps    ROS    Physical Exam   Constitutional: She appears well-developed and well-nourished. HENT:   Right Ear: External ear normal.   Left Ear: External ear normal.   Mouth/Throat: Oropharynx is clear and moist.   Neck: No thyromegaly present. Cardiovascular: Normal rate, regular rhythm, normal heart sounds and intact distal pulses. Pulmonary/Chest: Breath sounds normal. She has no wheezes. Abdominal: Soft. Bowel sounds are normal. She exhibits no distension and no mass. There is no tenderness. Musculoskeletal: She exhibits no edema. Lymphadenopathy:     She has no cervical adenopathy. Nursing note and vitals reviewed. ASSESSMENT and PLAN  Orders Placed This Encounter    HEPATITIS C AB    TSH 3RD GENERATION    AMB POC COMPLETE CBC, AUTOMATED    zolpidem (AMBIEN) 10 mg tablet     Sig: TAKE 1 TABLET BY MOUTH NIGHTLY AS NEEDED FOR SLEEP     Dispense:  30 Tab     Refill:  5     This request is for a new prescription for a controlled substance as required by Federal/State law.  quiNINE 324 mg capsule     Sig: Take 1 Cap by mouth nightly as needed for up to 30 days. Take as needed for leg cramps     Dispense:  20 Cap     Refill:  1     Orders Placed This Encounter    HEPATITIS C AB    TSH 3RD GENERATION    AMB POC COMPLETE CBC, AUTOMATED    zolpidem (AMBIEN) 10 mg tablet    quiNINE 324 mg capsule     Diagnoses and all orders for this visit:    1. Need for hepatitis C screening test  -     HEPATITIS C AB    2. Primary insomnia  -     zolpidem (AMBIEN) 10 mg tablet; TAKE 1 TABLET BY MOUTH NIGHTLY AS NEEDED FOR SLEEP    3. Leg cramps  -     quiNINE 324 mg capsule; Take 1 Cap by mouth nightly as needed for up to 30 days. Take as needed for leg cramps    4.  Acquired hypothyroidism  -     AMB POC COMPLETE CBC, AUTOMATED  -     TSH 3RD GENERATION

## 2018-08-09 NOTE — MR AVS SNAPSHOT
303 [facility] 
 
 
 [address] [facility] [place] [address] [address] [phone] Patient: Yue Bustos MRN: [id number] DEXTER:[birth year] Visit Information Date & Time Provider Department Dept. Phone Encounter #  
 8/9/2018  9:45 AM Lizeth Elder MD [facility] [phone] [id number] Your Appointments 12/18/2018  8:30 AM  
ROUTINE CARE with Ita Hathaway MD  
[facility]) Appt Note: Well Woman $0CP  
 305 [facility]. [address] [address] [address] [place] [address] Road [address] Upcoming Health Maintenance Date Due Hepatitis C Screening 1960 PAP AKA CERVICAL CYTOLOGY 4/22/2018 Influenza Age 5 to Adult 8/1/2018 BREAST CANCER SCRN MAMMOGRAM 10/20/2019 COLONOSCOPY 4/25/2024 DTaP/Tdap/Td series (2 - Td) 6/20/2026 Allergies as of 8/9/2018  Review Complete On: 8/9/2018 By: Lizeth Elder MD  
  
 Severity Noted Reaction Type Reactions Nuts [Tree Nut]  04/25/2016    Shortness of Breath Brazil nuts cause asthma type symptoms Current Immunizations  Reviewed on 10/14/2015 Name Date Influenza Vaccine (Quad) PF 9/1/2017, 9/26/2016, 10/14/2015, 1/22/2015 Influenza Vaccine PF 4/2/2014 Tdap 6/20/2016 Not reviewed this visit You Were Diagnosed With   
  
 Codes Comments Need for hepatitis C screening test    -  Primary ICD-10-CM: Z11.59 
ICD-9-CM: V73.89 Primary insomnia     ICD-10-CM: F51.01 
ICD-9-CM: 307.42 Leg cramps     ICD-10-CM: R25.2 ICD-9-CM: 729.82 Acquired hypothyroidism     ICD-10-CM: E03.9 ICD-9-CM: 224. 9 Vitals BP Pulse Temp Resp Height(growth percentile) Weight(growth percentile) 137/89 84 98.4 °F (36.9 °C) (Oral) 14 5' 11\" (1.803 m) 236 lb 6.4 oz (107.2 kg) LMP SpO2 BMI OB Status Smoking Status 01/13/2012 94% 32.97 kg/m2 Postmenopausal Never Smoker Vitals History BMI and BSA Data Body Mass Index Body Surface Area  
 32.97 kg/m 2 2.32 m 2 Preferred Pharmacy Pharmacy Name Phone [facility] #[id number]- [place] - [address] [phone] Your Updated Medication List  
  
   
This list is accurate as of 8/9/18 10:34 AM.  Always use your most recent med list.  
  
  
  
  
 diphenhydrAMINE 25 mg capsule Commonly known as:  BENADRYL One tab at bedtime as needed for leg cramps  
  
 levothyroxine 100 mcg tablet Commonly known as:  SYNTHROID  
TAKE 1 TAB BY MOUTH DAILY (BEFORE BREAKFAST). meloxicam 7.5 mg tablet Commonly known as:  MOBIC Take  by mouth daily. quiNINE 324 mg capsule Take 1 Cap by mouth nightly as needed for up to 30 days. Take as needed for leg cramps  
  
 vitamin e 1,000 unit capsule Commonly known as:  E GEMS Take 1 Cap by mouth daily. One at bedtime for leg cramps. zolpidem 10 mg tablet Commonly known as:  AMBIEN  
TAKE 1 TABLET BY MOUTH NIGHTLY AS NEEDED FOR SLEEP Prescriptions Printed Refills  
 zolpidem (AMBIEN) 10 mg tablet 5 Sig: TAKE 1 TABLET BY MOUTH NIGHTLY AS NEEDED FOR SLEEP Class: Print Prescriptions Sent to Pharmacy Refills  
 quiNINE 324 mg capsule 1 Sig: Take 1 Cap by mouth nightly as needed for up to 30 days. Take as needed for leg cramps Class: Normal  
 Pharmacy: [facility]/pharmacy [address] [place] [address] [address] Ph #: [phone] Route: Oral  
  
We Performed the Following AMB POC COMPLETE CBC, AUTOMATED [78003 CPT(R)] HEPATITIS C AB [66578 CPT(R)] TSH 3RD GENERATION [34779 CPT(R)] Introducing hospitals & HEALTH SERVICES! Dear Kenny Harman: Thank you for requesting a SpinalMotion account.   Our records indicate that you already have an active Ravenflow account. You can access your account anytime at [email or web address] Did you know that you can access your hospital and ER discharge instructions at any time in Ravenflow? You can also review all of your test results from your hospital stay or ER visit. Additional Information If you have questions, please visit the Frequently Asked Questions section of the Ravenflow website at [email or web address] Remember, Ravenflow is NOT to be used for urgent needs. For medical emergencies, dial 911. Now available from your iPhone and Android! Please provide this summary of care documentation to your next provider. Your primary care clinician is listed as Anyi Gonzalez. If you have any questions after today's visit, please call [phone].

## 2018-08-10 LAB
HCV AB S/CO SERPL IA: <0.1 S/CO RATIO (ref 0–0.9)
TSH SERPL DL<=0.005 MIU/L-ACNC: 1.09 UIU/ML (ref 0.45–4.5)

## 2018-09-04 RX ORDER — LEVOTHYROXINE SODIUM 100 UG/1
TABLET ORAL
Qty: 90 TAB | Refills: 3 | Status: SHIPPED | OUTPATIENT
Start: 2018-09-04 | End: 2019-11-01 | Stop reason: SDUPTHER

## 2018-09-04 NOTE — TELEPHONE ENCOUNTER
Last Visit: 8/9-Roly Cadena Appt: None  Previous Refill Encounter: 8/17/17-90+2    Requested Prescriptions     Pending Prescriptions Disp Refills    levothyroxine (SYNTHROID) 100 mcg tablet 90 Tab 3     Sig: TAKE 1 TAB BY MOUTH DAILY (BEFORE BREAKFAST).

## 2018-10-19 ENCOUNTER — CLINICAL SUPPORT (OUTPATIENT)
Dept: FAMILY MEDICINE CLINIC | Age: 58
End: 2018-10-19

## 2018-10-19 DIAGNOSIS — Z23 ENCOUNTER FOR IMMUNIZATION: Primary | ICD-10-CM

## 2018-10-26 ENCOUNTER — HOSPITAL ENCOUNTER (OUTPATIENT)
Dept: MAMMOGRAPHY | Age: 58
Discharge: HOME | End: 2018-10-26
Attending: RADIOLOGY
Payer: COMMERCIAL

## 2018-10-26 DIAGNOSIS — Z12.31 VISIT FOR SCREENING MAMMOGRAM: ICD-10-CM

## 2018-10-26 PROCEDURE — 77067 SCR MAMMO BI INCL CAD: CPT

## 2018-11-01 ENCOUNTER — OFFICE VISIT (OUTPATIENT)
Dept: FAMILY MEDICINE CLINIC | Age: 58
End: 2018-11-01

## 2018-11-01 VITALS
WEIGHT: 240 LBS | SYSTOLIC BLOOD PRESSURE: 142 MMHG | HEART RATE: 79 BPM | DIASTOLIC BLOOD PRESSURE: 79 MMHG | OXYGEN SATURATION: 94 % | TEMPERATURE: 97.1 F | RESPIRATION RATE: 14 BRPM | HEIGHT: 71 IN | BODY MASS INDEX: 33.6 KG/M2

## 2018-11-01 DIAGNOSIS — R10.11 RUQ ABDOMINAL PAIN: Primary | ICD-10-CM

## 2018-11-01 DIAGNOSIS — R10.31 RIGHT LOWER QUADRANT ABDOMINAL PAIN: ICD-10-CM

## 2018-11-01 LAB
BILIRUB UR QL STRIP: NEGATIVE
GLUCOSE UR-MCNC: NEGATIVE MG/DL
KETONES P FAST UR STRIP-MCNC: NEGATIVE MG/DL
PH UR STRIP: 6.5 [PH] (ref 4.6–8)
PROT UR QL STRIP: NEGATIVE
SP GR UR STRIP: 1.02 (ref 1–1.03)
UA UROBILINOGEN AMB POC: NORMAL (ref 0.2–1)
URINALYSIS CLARITY POC: CLEAR
URINALYSIS COLOR POC: YELLOW
URINE BLOOD POC: NEGATIVE
URINE LEUKOCYTES POC: NORMAL
URINE NITRITES POC: NEGATIVE

## 2018-11-01 RX ORDER — ALBUTEROL SULFATE 90 UG/1
AEROSOL, METERED RESPIRATORY (INHALATION)
COMMUNITY
End: 2019-06-24 | Stop reason: ALTCHOICE

## 2018-11-01 RX ORDER — AMOXICILLIN 875 MG/1
TABLET, FILM COATED ORAL
COMMUNITY
Start: 2018-10-16 | End: 2019-06-24 | Stop reason: ALTCHOICE

## 2018-11-01 RX ORDER — PANTOPRAZOLE SODIUM 40 MG/1
40 TABLET, DELAYED RELEASE ORAL DAILY
Qty: 30 TAB | Refills: 12 | Status: SHIPPED | OUTPATIENT
Start: 2018-11-01 | End: 2019-06-24 | Stop reason: ALTCHOICE

## 2018-11-01 RX ORDER — HYDROCODONE BITARTRATE AND ACETAMINOPHEN 5; 325 MG/1; MG/1
TABLET ORAL
COMMUNITY
Start: 2018-10-31 | End: 2019-06-24 | Stop reason: ALTCHOICE

## 2018-11-01 NOTE — LETTER
11/2/2018 9:39 PM 
 
Ms. Brian Mckinley [address] [id number] Dear Brian Mckinley: Please find your most recent results below. Resulted Orders CBC WITH AUTOMATED DIFF Result Value Ref Range WBC 4.5 3.4 - 10.8 x10E3/uL  
 RBC 4.56 3.77 - 5.28 x10E6/uL HGB 12.5 11.1 - 15.9 g/dL HCT 37.4 34.0 - 46.6 % MCV 82 79 - 97 fL  
 MCH 27.4 26.6 - 33.0 pg  
 MCHC 33.4 31.5 - 35.7 g/dL  
 RDW 14.1 12.3 - 15.4 % PLATELET 272 331 - 780 x10E3/uL NEUTROPHILS 49 Not Estab. % Lymphocytes 36 Not Estab. % MONOCYTES 9 Not Estab. % EOSINOPHILS 6 Not Estab. % BASOPHILS 0 Not Estab. %  
 ABS. NEUTROPHILS 2.1 1.4 - 7.0 x10E3/uL Abs Lymphocytes 1.6 0.7 - 3.1 x10E3/uL  
 ABS. MONOCYTES 0.4 0.1 - 0.9 x10E3/uL  
 ABS. EOSINOPHILS 0.3 0.0 - 0.4 x10E3/uL  
 ABS. BASOPHILS 0.0 0.0 - 0.2 x10E3/uL IMMATURE GRANULOCYTES 0 Not Estab. %  
 ABS. IMM. GRANS. 0.0 0.0 - 0.1 x10E3/uL Narrative Performed at:  [address]  [id number] : Weston Castro MD, Phone:  [phone] METABOLIC PANEL, COMPREHENSIVE Result Value Ref Range Glucose 95 65 - 99 mg/dL BUN 20 6 - 24 mg/dL Creatinine 0.83 0.57 - 1.00 mg/dL GFR est non-AA 79 >59 mL/min/1.73 GFR est AA 91 >59 mL/min/1.73  
 BUN/Creatinine ratio 24 (H) 9 - 23 Sodium 143 134 - 144 mmol/L Potassium 3.9 3.5 - 5.2 mmol/L Chloride 103 96 - 106 mmol/L  
 CO2 25 20 - 29 mmol/L Calcium 9.5 8.7 - 10.2 mg/dL Protein, total 7.3 6.0 - 8.5 g/dL Albumin 4.3 3.5 - 5.5 g/dL GLOBULIN, TOTAL 3.0 1.5 - 4.5 g/dL A-G Ratio 1.4 1.2 - 2.2 Bilirubin, total 0.2 0.0 - 1.2 mg/dL Alk. phosphatase 146 (H) 39 - 117 IU/L  
 AST (SGOT) 16 0 - 40 IU/L  
 ALT (SGPT) 15 0 - 32 IU/L Narrative Performed at:  [address]  [id number] : Weston Castro MD, Phone:  [phone] AMB POC URINALYSIS DIP STICK AUTO W/ MICRO Result Value Ref Range Color (UA POC) Yellow Clarity (UA POC) Clear Glucose (UA POC) Negative Negative Bilirubin (UA POC) Negative Negative Ketones (UA POC) Negative Negative Specific gravity (UA POC) 1.020 1.001 - 1.035 Blood (UA POC) Negative Negative pH (UA POC) 6.5 4.6 - 8.0 Protein (UA POC) Negative Negative Urobilinogen (UA POC) 0.2 mg/dL 0.2 - 1 Nitrites (UA POC) Negative Negative Leukocyte esterase (UA POC) Trace Negative Narrative Microscopic UA          Reference Range WBC   2-4                       (0-3/HPF) RBC    neg                       (0-1/HPF) EPITH 3-6                        (2-4/HPF) CRYST neg                      (VARIABLE) CASTS neg                      (0/LPF) BACTERIA few                (VARIABLE) YEAST neg                      (NEGATIVE) [address] [facility] neg                       (NEGATIVE) CLUE CELLS neg            (0-1/LPF) OTHER: neg                      (N/A) [facility] [address] RECOMMENDATIONS: 
Continue with current  medications. Please call me if you have any questions: [phone] Sincerely, 
 
 
Luz Braga MD

## 2018-11-01 NOTE — PROGRESS NOTES
HISTORY OF PRESENT ILLNESS Magno Dunham is a 66 y.o. female. HPI 2 month hx dull RUQ and RLQ pain, had cholecystectomy 2 years ago. Has also been having belching after eating. Doesn't really think that it gets better or worse after eating. Feels like there is a weight there. No change in weight. No diarrhea, constipation. No hematuria, vaginal bleeding. Tums - no relief. ROS Physical Exam  
Constitutional: She appears well-developed and well-nourished. HENT:  
Right Ear: External ear normal.  
Left Ear: External ear normal.  
Mouth/Throat: Oropharynx is clear and moist.  
Neck: No thyromegaly present. Cardiovascular: Normal rate, regular rhythm, normal heart sounds and intact distal pulses. Pulmonary/Chest: Breath sounds normal. She has no wheezes. Abdominal: Soft. Bowel sounds are normal. She exhibits no distension and no mass. There is no tenderness. Musculoskeletal: She exhibits no edema. Lymphadenopathy:  
  She has no cervical adenopathy. Nursing note and vitals reviewed. ASSESSMENT and PLAN Orders Placed This Encounter  CT ABD PELV W WO CONT Standing Status:   Future Standing Expiration Date:   12/1/2019 Order Specific Question:   Is Patient Allergic to Contrast Dye? Answer:   No  
  Order Specific Question:   STAT Creatinine as indicated Answer:   Yes Order Specific Question: This order utilizes IV contrast.  What additional contrast is needed? Answer:   Per Radiologist Protocol  CBC WITH AUTOMATED DIFF  
 METABOLIC PANEL, COMPREHENSIVE  
 AMB POC URINALYSIS DIP STICK AUTO W/ MICRO  pantoprazole (PROTONIX) 40 mg tablet Sig: Take 1 Tab by mouth daily. For stomach pain Dispense:  30 Tab Refill:  12 Orders Placed This Encounter  CT ABD PELV W WO CONT  CBC WITH AUTOMATED DIFF  
 METABOLIC PANEL, COMPREHENSIVE  
 AMB POC URINALYSIS DIP STICK AUTO W/ MICRO  pantoprazole (PROTONIX) 40 mg tablet Diagnoses and all orders for this visit: 1. RUQ abdominal pain 
-     CBC WITH AUTOMATED DIFF 
-     METABOLIC PANEL, COMPREHENSIVE 
-     AMB POC URINALYSIS DIP STICK AUTO W/ MICRO 2. Right lower quadrant abdominal pain 
-     CT ABD PELV W WO CONT; Future Other orders -     pantoprazole (PROTONIX) 40 mg tablet; Take 1 Tab by mouth daily. For stomach pain Follow-up Disposition: 
Return in about 4 weeks (around 11/29/2018).

## 2018-11-01 NOTE — PROGRESS NOTES
Chief Complaint Patient presents with  Abdominal Pain  
  x 2 month RUQ   pain     belching reported 1. Have you been to the ER, urgent care clinic since your last visit? Hospitalized since your last visit? No 
 
2. Have you seen or consulted any other health care providers outside of the [address] [place] [address] since your last visit? Include any pap smears or colon screening. No  
 
Health Maintenance Due Topic Date Due  Shingrix Vaccine Age 50> (1 of 2) 02/11/2012  PAP AKA CERVICAL CYTOLOGY  04/22/2018

## 2018-11-02 LAB
ALBUMIN SERPL-MCNC: 4.3 G/DL (ref 3.5–5.5)
ALBUMIN/GLOB SERPL: 1.4 {RATIO} (ref 1.2–2.2)
ALP SERPL-CCNC: 146 IU/L (ref 39–117)
ALT SERPL-CCNC: 15 IU/L (ref 0–32)
AST SERPL-CCNC: 16 IU/L (ref 0–40)
BASOPHILS # BLD AUTO: 0 X10E3/UL (ref 0–0.2)
BASOPHILS NFR BLD AUTO: 0 %
BILIRUB SERPL-MCNC: 0.2 MG/DL (ref 0–1.2)
BUN SERPL-MCNC: 20 MG/DL (ref 6–24)
BUN/CREAT SERPL: 24 (ref 9–23)
CALCIUM SERPL-MCNC: 9.5 MG/DL (ref 8.7–10.2)
CHLORIDE SERPL-SCNC: 103 MMOL/L (ref 96–106)
CO2 SERPL-SCNC: 25 MMOL/L (ref 20–29)
CREAT SERPL-MCNC: 0.83 MG/DL (ref 0.57–1)
EOSINOPHIL # BLD AUTO: 0.3 X10E3/UL (ref 0–0.4)
EOSINOPHIL NFR BLD AUTO: 6 %
ERYTHROCYTE [DISTWIDTH] IN BLOOD BY AUTOMATED COUNT: 14.1 % (ref 12.3–15.4)
GLOBULIN SER CALC-MCNC: 3 G/DL (ref 1.5–4.5)
GLUCOSE SERPL-MCNC: 95 MG/DL (ref 65–99)
HCT VFR BLD AUTO: 37.4 % (ref 34–46.6)
HGB BLD-MCNC: 12.5 G/DL (ref 11.1–15.9)
IMM GRANULOCYTES # BLD: 0 X10E3/UL (ref 0–0.1)
IMM GRANULOCYTES NFR BLD: 0 %
LYMPHOCYTES # BLD AUTO: 1.6 X10E3/UL (ref 0.7–3.1)
LYMPHOCYTES NFR BLD AUTO: 36 %
MCH RBC QN AUTO: 27.4 PG (ref 26.6–33)
MCHC RBC AUTO-ENTMCNC: 33.4 G/DL (ref 31.5–35.7)
MCV RBC AUTO: 82 FL (ref 79–97)
MONOCYTES # BLD AUTO: 0.4 X10E3/UL (ref 0.1–0.9)
MONOCYTES NFR BLD AUTO: 9 %
NEUTROPHILS # BLD AUTO: 2.1 X10E3/UL (ref 1.4–7)
NEUTROPHILS NFR BLD AUTO: 49 %
PLATELET # BLD AUTO: 281 X10E3/UL (ref 150–379)
POTASSIUM SERPL-SCNC: 3.9 MMOL/L (ref 3.5–5.2)
PROT SERPL-MCNC: 7.3 G/DL (ref 6–8.5)
RBC # BLD AUTO: 4.56 X10E6/UL (ref 3.77–5.28)
SODIUM SERPL-SCNC: 143 MMOL/L (ref 134–144)
WBC # BLD AUTO: 4.5 X10E3/UL (ref 3.4–10.8)

## 2018-11-16 ENCOUNTER — HOSPITAL ENCOUNTER (OUTPATIENT)
Dept: CT IMAGING | Age: 58
Discharge: HOME | End: 2018-11-16
Attending: FAMILY MEDICINE

## 2018-11-16 DIAGNOSIS — R10.31 RIGHT LOWER QUADRANT ABDOMINAL PAIN: ICD-10-CM

## 2018-11-16 RX ORDER — SODIUM CHLORIDE 0.9 % (FLUSH) 0.9 %
10 SYRINGE (ML) INJECTION
Status: DISPENSED | OUTPATIENT
Start: 2018-11-16 | End: 2018-11-16

## 2018-11-30 ENCOUNTER — HOSPITAL ENCOUNTER (OUTPATIENT)
Dept: CT IMAGING | Age: 58
Discharge: HOME | End: 2018-11-30
Attending: FAMILY MEDICINE
Payer: COMMERCIAL

## 2018-11-30 DIAGNOSIS — R10.31 RIGHT LOWER QUADRANT ABDOMINAL PAIN: ICD-10-CM

## 2018-11-30 PROCEDURE — 74011000255 HC RX REV CODE- 255: Performed by: FAMILY MEDICINE

## 2018-11-30 PROCEDURE — 74011636320 HC RX REV CODE- 636/320: Performed by: FAMILY MEDICINE

## 2018-11-30 PROCEDURE — 74177 CT ABD & PELVIS W/CONTRAST: CPT

## 2018-11-30 RX ORDER — SODIUM CHLORIDE 0.9 % (FLUSH) 0.9 %
10 SYRINGE (ML) INJECTION
Status: COMPLETED | OUTPATIENT
Start: 2018-11-30 | End: 2018-11-30

## 2018-11-30 RX ORDER — BARIUM SULFATE 20 MG/ML
450 SUSPENSION ORAL
Status: COMPLETED | OUTPATIENT
Start: 2018-11-30 | End: 2018-11-30

## 2018-11-30 RX ADMIN — BARIUM SULFATE 450 ML: 20 SUSPENSION ORAL at 08:45

## 2018-11-30 RX ADMIN — Medication 10 ML: at 08:45

## 2018-11-30 RX ADMIN — IOPAMIDOL 100 ML: 755 INJECTION, SOLUTION INTRAVENOUS at 08:45

## 2018-12-03 ENCOUNTER — TELEPHONE (OUTPATIENT)
Dept: FAMILY MEDICINE CLINIC | Age: 58
End: 2018-12-03

## 2019-02-14 ENCOUNTER — HOSPITAL ENCOUNTER (OUTPATIENT)
Dept: LAB | Age: 59
Discharge: HOME | End: 2019-02-14
Payer: COMMERCIAL

## 2019-02-14 ENCOUNTER — OFFICE VISIT (OUTPATIENT)
Dept: OBGYN CLINIC | Age: 59
End: 2019-02-14

## 2019-02-14 VITALS
DIASTOLIC BLOOD PRESSURE: 84 MMHG | WEIGHT: 250.6 LBS | HEART RATE: 75 BPM | HEIGHT: 71 IN | TEMPERATURE: 97.8 F | SYSTOLIC BLOOD PRESSURE: 136 MMHG | BODY MASS INDEX: 35.08 KG/M2 | RESPIRATION RATE: 20 BRPM

## 2019-02-14 DIAGNOSIS — R23.2 HOT FLASHES: ICD-10-CM

## 2019-02-14 DIAGNOSIS — Z12.4 PAP SMEAR FOR CERVICAL CANCER SCREENING: ICD-10-CM

## 2019-02-14 DIAGNOSIS — Z01.419 ENCOUNTER FOR GYNECOLOGICAL EXAMINATION WITHOUT ABNORMAL FINDING: Primary | ICD-10-CM

## 2019-02-14 DIAGNOSIS — G47.00 INSOMNIA, UNSPECIFIED TYPE: ICD-10-CM

## 2019-02-14 DIAGNOSIS — Z12.31 VISIT FOR SCREENING MAMMOGRAM: ICD-10-CM

## 2019-02-14 PROCEDURE — 88175 CYTOPATH C/V AUTO FLUID REDO: CPT

## 2019-02-14 RX ORDER — ZOLPIDEM TARTRATE 5 MG/1
5 TABLET ORAL
Qty: 12 TAB | Refills: 0 | Status: SHIPPED | OUTPATIENT
Start: 2019-02-14 | End: 2019-06-24 | Stop reason: ALTCHOICE

## 2019-02-14 NOTE — PROGRESS NOTES
Chief Complaint Patient presents with  Well Woman  
 
3 most recent PHQ Screens 2/14/2019 Little interest or pleasure in doing things Not at all Feeling down, depressed, irritable, or hopeless Not at all Total Score PHQ 2 0  
 
1. Have you been to the ER, urgent care clinic since your last visit? Hospitalized since your last visit? No 
 
2. Have you seen or consulted any other health care providers outside of the [address] [place] [address] since your last visit? Include any pap smears or colon screening.  No

## 2019-02-14 NOTE — PATIENT INSTRUCTIONS
Menopause Diet: Care Instructions Your Care Instructions Healthy eating helps ease menopause symptoms. And it can reduce your risk for getting conditions such as osteoporosis and heart disease. Follow-up care is a key part of your treatment and safety. Be sure to make and go to all appointments, and call your doctor if you are having problems. It's also a good idea to know your test results and keep a list of the medicines you take. How can you care for yourself at home? · Limit fats in your diet. · Choose foods that have a lot of calcium. The recommended daily intake for adults ages 23 to 48 is 1,000 milligrams (mg). Adults over 50 need 1,200 mg a day. Take a calcium supplement if you don't get enough calcium in the foods you eat. · Add vitamin D to your daily diet. It helps your body use calcium. The recommended daily intake of vitamin D is 600 international units (IU) a day for children and adults up to age 79. Adults age 70 and older need 800 IU a day. Take vitamin D supplements if you need to. · Include good sources of fiber in your diet each day. These include whole grains, beans, fruits, and vegetables. · Avoid simple sugars. This helps if you have mood swings, anxiety, or depression. · Avoid caffeine, or cut back on it. Caffeine can cause sleep problems. It can also make you feel anxious. To relieve these symptoms, pay attention to how much caffeine you are getting in drinks and chocolate. · Limit your intake of alcohol. Heavy drinking tends to make symptoms worse. Where can you learn more? Go to http://james-kaz.info/. Enter I399 in the search box to learn more about \"Menopause Diet: Care Instructions. \" Current as of: March 28, 2018 Content Version: 11.9 © 1668-7492 [employer].  Care instructions adapted under license by [employer] (which disclaims liability or warranty for this information). If you have questions about a medical condition or this instruction, always ask your healthcare professional. [address] any warranty or liability for your use of this information. Hot Flashes During Menopause: Care Instructions Your Care Instructions A hot flash is a sudden feeling of intense body heat. Your head, neck, and chest may get red. Your heartbeat may speed up, and you may feel anxious or irritable. You may find that hot flashes occur more often in warm rooms or during stressful times. Hot flashes and other symptoms are a normal response to the hormone changes that occur before your menstrual cycle goes away completely (menopause). Hot flashes often get better and go away with time. Making a few changes, such as exercising more, practicing meditation, quitting smoking, and drinking less alcohol, can help. Some women take hormone pills or other medicine to treat bothersome symptoms. Follow-up care is a key part of your treatment and safety. Be sure to make and go to all appointments, and call your doctor if you are having problems. It's also a good idea to know your test results and keep a list of the medicines you take. How can you care for yourself at home? · If you decide to take medicine to treat hot flashes, take it exactly as prescribed. Call your doctor if you think you are having a problem with your medicine. You will get more details on the specific medicine your doctor prescribes. · Learn to meditate. Sit quietly and focus on your breathing. Try to practice each day. Books, classes, and tapes can help you start a program. 
· Wear natural fabrics, such as cotton and silk. Dress in layers so you can take off clothes as needed. · Keep the room temperature cool, or use a fan. You are more likely to have a hot flash when you are too warm than when you are cool. · Use fewer blankets when you sleep at night. · Drink cold fluids rather than hot ones. Limit your intake of caffeine and alcohol. · Eat smaller meals more often during the day so your body makes less heat than when digesting large amounts of food. Eat low-fat and high-fiber foods. · Do not smoke. Smoking can make hot flashes worse. If you need help quitting, talk to your doctor about stop-smoking programs and medicines. These can increase your chances of quitting for good. · Get at least 30 minutes of exercise on most days of the week. Walking is a good choice. You also may want to do other activities, such as running, swimming, cycling, or playing tennis or team sports. Where can you learn more? Go to http://jamesMira Designskaz.info/. Enter F700 in the search box to learn more about \"Hot Flashes During Menopause: Care Instructions. \" Current as of: May 14, 2018 Content Version: 11.9 © 8935-4523 [employer]. Care instructions adapted under license by [employer] (which disclaims liability or warranty for this information). If you have questions about a medical condition or this instruction, always ask your healthcare professional. [place] [address] any warranty or liability for your use of this information. Learning About Breast Cancer Screening What is breast cancer screening? Breast cancer occurs when cells that are not normal grow in one or both of your breasts. Screening tests can help find breast cancer early. Cancer is easier to treat when it's found early. Having concerns about breast cancer is common. That's why it's important to talk with your doctor about when to start and how often to get screened for breast cancer. How is breast cancer screening done? Several screening tests can be used to check for breast cancer. · Mammograms check for signs of cancer using X-rays. They can show tumors that are too small for you or your doctor to feel.  During a mammogram, a machine squeezes your breasts to make them flatter and easier to X-ray. At least two pictures are taken of each breast. One is taken from the top and one from the side. · 3-D mammograms are also called digital breast tomosynthesis. Your breast is positioned on a flat plate. A top plate is pressed against your breast to keep it in position. The X-ray arm then moves in an arc above the breast and takes many pictures. A computer uses these X-rays to create a three-dimensional image. · Clinical breast exams are a doctor's exam. Your doctor carefully feels your breasts and under your arms to check for lumps or other changes. After the screening, your doctor will tell you the results. You will also be told if you need any follow-up tests. When should you get screened? Talk with your doctor about when you should start being tested for breast cancer. How often you get tested and the kind of tests you get will depend on your age and your risk. The guidelines that follow are for women who have an average risk for breast cancer. If you have a higher risk for breast cancer, such as having a family history of breast cancer in multiple relatives or at a young age, your doctor may recommend different screening for you. · Ages 21 to 44: Some experts recommend that women have a clinical breast exam every 3 years, starting at age 21. Ask your doctor how often you should have this test. If you have a high risk for breast cancer, talk with your doctor about when to start yearly mammograms and other screening tests. · Ages 36 and older: Talk with your doctor about how often you should have mammograms and clinical breast exams. What is your risk for breast cancer? If you don't already know your risk of breast cancer, you can ask your doctor about it. You can also look it up at www.cancer.gov/bcrisktool/.  
If your doctor says that you have a high or very high risk, ask about ways to reduce your risk. These could include getting extra screening, taking medicine, or having surgery. If you have a strong family history of breast cancer, ask your doctor about genetic testing. What steps can you take to stay healthy? Some things that increase your risk of breast cancer, such as your age and being female, cannot be controlled. But you can do some things to stay as healthy as you can. · Learn what your breasts normally look and feel like. If you notice any changes, tell your doctor. · Drink alcohol wisely. Your risk goes up the more you drink. For the best health, women should have no more than 1 drink a day or 7 drinks a week. · If you smoke, quit. When you quit smoking, you lower your chances of getting many types of cancer. You can also do your best to eat well, be active, and stay at a healthy weight. Eating healthy foods and being active every day, as well as staying at a healthy weight, may help prevent cancer. Where can you learn more? Go to http://james-kaz.info/. Enter H825 in the search box to learn more about \"Learning About Breast Cancer Screening. \" Current as of: March 27, 2018 Content Version: 11.9 © 4722-5899 [employer]. Care instructions adapted under license by [email or web address] (which disclaims liability or warranty for this information). If you have questions about a medical condition or this instruction, always ask your healthcare professional. [place] [address] any warranty or liability for your use of this information. Learning About Cervical Cancer Screening What is a cervical cancer screening test? 
 
Cervical cancer screening tests check for cancer of the cervix. The cervix is the lower part of the uterus that opens into the vagina. The test can help your doctor find early changes in the cells that could lead to cancer. Two tests can be used to screen for cervical cancer.  They may be used alone or together. A Pap test.  
This test looks for changes in the cells of the cervix. Abnormal cells may be a sign of cancer. A human papillomavirus (HPV) test.  
This test looks for the HPV virus. Some types of HPV can cause cancer. During either test, the doctor or nurse will insert a tool called a speculum into your vagina. The speculum gently spreads apart the vaginal walls. It allows your doctor to see inside the vagina and the cervix. He or she uses a small swab or brush to collect cell samples from your cervix. Try to schedule the test when you're not having your period. To get ready for the test, avoid douches, tampons, vaginal medicines, sprays, and powders for at least a day before you have the test. 
When should you have a screening test? 
These guidelines apply to women who are at average risk of cervical cancer. If you don't know your risk, talk with your doctor. Women 21 to 34 
· You can start having a Pap test at age 24. It is done every 3 years. · You can start having the primary HPV test at age 22. It is done every 3 years. Women 30 to 59 · If you had both a Pap test and an HPV test last time and both were normal, you can have a Pap plus an HPV test every 5 years. · If you had only a Pap test last time, as long as your results are normal, you can have a Pap test every 3 years. · If you had only an HPV test last time, as long as your results are normal, you can have an HPV test every 3 years. Women 72 and older · If you are age 72 or older, talk with your doctor about what's right for you. Women ages 72 and older may no longer need to be screened for cervical cancer. When to stop having screening tests depends on your medical history, your overall health, and your risk of cervical cell changes or cervical cancer.  
What happens after the test? 
The sample of cells taken during your test will be sent to a lab so that an expert can look at the cells. It usually takes a week or two to get the results back. Pap tests · A normal result means that the test didn't find any abnormal cells in the sample. · An abnormal result can mean many things. Most of these aren't cancer. The results of your test may be abnormal because: ? You have an infection of the vagina or cervix, such as a yeast infection. ? You have an IUD (intrauterine device for birth control). ? You have low estrogen levels after menopause that are causing the cells to change. ? You have cell changes that may be a sign of precancer or cancer. The results are ranked based on how serious the changes might be. HPV tests · A normal result means that the test didn't find any high-risk HPV in the sample. · An abnormal HPV test doesn't mean that you have cervical cancer. It may mean that you are infected with one or more high-risk types of HPV. This increases your chance of having cell changes that may be a sign of precancer or cancer. Follow-up care is a key part of your treatment and safety. Be sure to make and go to all appointments, and call your doctor if you are having problems. It's also a good idea to know your test results and keep a list of the medicines you take. Where can you learn more? Go to http://james-kaz.info/. Enter P919 in the search box to learn more about \"Learning About Cervical Cancer Screening. \" Current as of: March 27, 2018 Content Version: 11.9 © 7001-1962 [employer]. Care instructions adapted under license by [employer] (which disclaims liability or warranty for this information). If you have questions about a medical condition or this instruction, always ask your healthcare professional. [place] [address] any warranty or liability for your use of this information. Well Visit, Women 48 to 72: Care Instructions Your Care Instructions Physical exams can help you stay healthy. Your doctor has checked your overall health and may have suggested ways to take good care of yourself. He or she also may have recommended tests. At home, you can help prevent illness with healthy eating, regular exercise, and other steps. Follow-up care is a key part of your treatment and safety. Be sure to make and go to all appointments, and call your doctor if you are having problems. It's also a good idea to know your test results and keep a list of the medicines you take. How can you care for yourself at home? · Reach and stay at a healthy weight. This will lower your risk for many problems, such as obesity, diabetes, heart disease, and high blood pressure. · Get at least 30 minutes of exercise on most days of the week. Walking is a good choice. You also may want to do other activities, such as running, swimming, cycling, or playing tennis or team sports. · Do not smoke. Smoking can make health problems worse. If you need help quitting, talk to your doctor about stop-smoking programs and medicines. These can increase your chances of quitting for good. · Protect your skin from too much sun. When you're outdoors from 10 a.m. to 4 p.m., stay in the shade or cover up with clothing and a hat with a wide brim. Wear sunglasses that block UV rays. Even when it's cloudy, put broad-spectrum sunscreen (SPF 30 or higher) on any exposed skin. · See a dentist one or two times a year for checkups and to have your teeth cleaned. · Wear a seat belt in the car. · Limit alcohol to 1 drink a day. Too much alcohol can cause health problems. Follow your doctor's advice about when to have certain tests. These tests can spot problems early. · Cholesterol. Your doctor will tell you how often to have this done based on your age, family history, or other things that can increase your risk for heart attack and stroke. · Blood pressure. Have your blood pressure checked during a routine doctor visit. Your doctor will tell you how often to check your blood pressure based on your age, your blood pressure results, and other factors. · Mammogram. Ask your doctor how often you should have a mammogram, which is an X-ray of your breasts. A mammogram can spot breast cancer before it can be felt and when it is easiest to treat. · Pap test and pelvic exam. Ask your doctor how often you should have a Pap test. You may not need to have a Pap test as often as you used to. · Vision. Have your eyes checked every year or two or as often as your doctor suggests. Some experts recommend that you have yearly exams for glaucoma and other age-related eye problems starting at age 48. · Hearing. Tell your doctor if you notice any change in your hearing. You can have tests to find out how well you hear. · Diabetes. Ask your doctor whether you should have tests for diabetes. · Colon cancer. You should begin tests for colon cancer at age 48. You may have one of several tests. Your doctor will tell you how often to have tests based on your age and risk. Risks include whether you already had a precancerous polyp removed from your colon or whether your parents, sisters and brothers, or children have had colon cancer. · Thyroid disease. Talk to your doctor about whether to have your thyroid checked as part of a regular physical exam. Women have an increased chance of a thyroid problem. · Osteoporosis. You should begin tests for bone density at age 72. If you are younger than 72, ask your doctor whether you have factors that may increase your risk for this disease. You may want to have this test before age 72. · Heart attack and stroke risk. At least every 4 to 6 years, you should have your risk for heart attack and stroke assessed.  Your doctor uses factors such as your age, blood pressure, cholesterol, and whether you smoke or have diabetes to show what your risk for a heart attack or stroke is over the next 10 years. When should you call for help? Watch closely for changes in your health, and be sure to contact your doctor if you have any problems or symptoms that concern you. Where can you learn more? Go to http://james-kaz.info/. Enter L052 in the search box to learn more about \"Well Visit, Women 50 to 72: Care Instructions. \" Current as of: March 28, 2018 Content Version: 11.9 © 1322-6687 [employer]. Care instructions adapted under license by [employer] (which disclaims liability or warranty for this information). If you have questions about a medical condition or this instruction, always ask your healthcare professional. [address] any warranty or liability for your use of this information.

## 2019-02-14 NOTE — PROGRESS NOTES
Annual exam ages 40-58 Francisco Calhoun is a ,  64 y.o. female [address] Patient's last menstrual period was 2012. Carl El She presents for her annual checkup. She is having no significant problems. With regard to the Gardasil vaccine, she is older than the age for which it is FDA approved. Menstrual status: 
Postmenopausal. 
 
Occasional hot flashes and night sweats, which are tolerable. Has been on HRT in the past and patient did not like it. Sexual history: She  reports that she does not currently engage in sexual activity. Medical conditions: 
 
Since her last annual GYN exam about one year ago, she has not the following changes in her health history: none. Pap and Mammogram History: 
 
Her most recent Pap smear was  - pap. No hx of abnormal. 
 
The patient with normal mammogram 10/2018. Breast Cancer History/Substance Abuse: negative Osteoporosis History: 
 
Family history does not include a first or second degree relative with osteopenia or osteoporosis. She is currently taking calcium and vit D. Past Medical History:  
Diagnosis Date  Anxiety and depression  Asthma  Hyperthyroidism   
 s/p 32.1 mCi on 12 for toxic multinodular goiter  Other postablative hypothyroidism 2012 Past Surgical History:  
Procedure Laterality Date  HX BUNIONECTOMY    
 bilateral  
 HX CHOLECYSTECTOMY  HX COLONOSCOPY  - Dr. Quinn Monae-  
 normal  
 HX LAP CHOLECYSTECTOMY  [facility]  
 HX TONSILLECTOMY Current Outpatient Medications Medication Sig Dispense Refill  albuterol (VENTOLIN HFA) 90 mcg/actuation inhaler Take  by inhalation.  levothyroxine (SYNTHROID) 100 mcg tablet TAKE 1 TAB BY MOUTH DAILY (BEFORE BREAKFAST). 90 Tab 3  
 zolpidem (AMBIEN) 10 mg tablet TAKE 1 TABLET BY MOUTH NIGHTLY AS NEEDED FOR SLEEP 30 Tab 5  
 meloxicam (MOBIC) 7.5 mg tablet Take  by mouth daily.  vitamin e (E GEMS) 1,000 unit capsule Take 1 Cap by mouth daily. One at bedtime for leg cramps. 30 Cap 12  
 amoxicillin (AMOXIL) 875 mg tablet  HYDROcodone-acetaminophen (NORCO) 5-325 mg per tablet  pantoprazole (PROTONIX) 40 mg tablet Take 1 Tab by mouth daily. For stomach pain (Patient not taking: Reported on 2/14/2019) 30 Tab 12  
 diphenhydrAMINE (BENADRYL) 25 mg capsule One tab at bedtime as needed for leg cramps (Patient not taking: Reported on 2/14/2019) 30 Cap 5 Allergies: Nuts [tree nut] Tobacco History:  reports that  has never smoked. she has never used smokeless tobacco. 
Alcohol Abuse:  reports that she does not drink alcohol. Drug Abuse:  reports that she does not use drugs. Patient feels safe at home. Family Medical/Cancer History:  
Family History Problem Relation Age of Onset  Thyroid Disease Mother  Hypertension Mother Review of Systems - History obtained from the patient Constitutional: negative for weight loss, fever, night sweats HEENT: negative for hearing loss, earache, congestion, snoring, sorethroat CV: negative for chest pain, palpitations, edema Resp: negative for cough, shortness of breath, wheezing GI: negative for change in bowel habits, abdominal pain, black or bloody stools : negative for frequency, dysuria, hematuria, vaginal discharge MSK: negative for back pain, joint pain, muscle pain Breast: negative for breast lumps, nipple discharge, galactorrhea Skin :negative for itching, rash, hives Neuro: negative for dizziness, headache, confusion, weakness Psych: negative for anxiety, depression, change in mood Heme/lymph: negative for bleeding, bruising, pallor Physical Exam 
 
Visit Vitals /84 (BP 1 Location: Right arm, BP Patient Position: Sitting) Pulse 75 Temp 97.8 °F (36.6 °C) (Oral) Resp 20 Ht 5' 11\" (1.803 m) Wt 250 lb 9.6 oz (113.7 kg) LMP 01/13/2012 BMI 34.95 kg/m² Constitutional 
 · Appearance: well-nourished, well developed, alert, in no acute distress HENT 
· Head and Face: appears normal 
 
Neck · Inspection/Palpation: normal appearance, no masses or tenderness · Lymph Nodes: no lymphadenopathy present · Thyroid: gland size normal, nontender, no nodules or masses present on palpation Chest 
· Respiratory Effort: breathing unlabored · Auscultation: normal breath sounds Cardiovascular · Heart: 
· Auscultation: regular rate and rhythm without murmur Breasts · Inspection of Breasts: breasts symmetrical, no skin changes, no discharge present, nipple appearance normal, no skin retraction present · Palpation of Breasts and Axillae: no masses present on palpation, no breast tenderness · Axillary Lymph Nodes: no lymphadenopathy present Gastrointestinal 
· Abdominal Examination: abdomen non-tender to palpation, normal bowel sounds, no masses present · Liver and spleen: no hepatomegaly present, spleen not palpable · Hernias: no hernias identified Genitourinary · External Genitalia: normal appearance for age, no discharge present, no tenderness present, no inflammatory lesions present, no masses present, no atrophy present · Vagina: normal vaginal vault without central or paravaginal defects, no discharge present, no inflammatory lesions present, no masses present · Bladder: non-tender to palpation · Urethra: appears normal 
· Cervix: normal  
· Uterus: normal size, shape and consistency · Adnexa: no adnexal tenderness present, no adnexal masses present · Perineum: perineum within normal limits, no evidence of trauma, no rashes or skin lesions present · Anus: anus within normal limits, no hemorrhoids present · Inguinal Lymph Nodes: no lymphadenopathy present Skin · General Inspection: no rash, no lesions identified Neurologic/Psychiatric · Mental Status: · Orientation: grossly oriented to person, place and time · Mood and Affect: mood normal, affect appropriate Assessment: 
Routine gynecologic examination Her current medical status is satisfactory with no evidence of significant gynecologic issues. Plan: 
- pap smear today 
- mammogram slip 
- ambien PRN for insomnia 
- hot flash diet info. Counseled re: diet, exercise, healthy lifestyle Return for yearly wellness visits Rec annual mammogram

## 2019-02-19 NOTE — PROGRESS NOTES
Please send patient a letter informing her that her pap smear is normal.  She should follow up for an annual exam in a year. Thank you.

## 2019-02-21 RX ORDER — ALBUTEROL SULFATE 90 UG/1
AEROSOL, METERED RESPIRATORY (INHALATION)
Qty: 1 INHALER | Refills: 12 | Status: SHIPPED | OUTPATIENT
Start: 2019-02-21 | End: 2019-09-23 | Stop reason: SDUPTHER

## 2019-02-25 DIAGNOSIS — F51.01 PRIMARY INSOMNIA: ICD-10-CM

## 2019-02-25 RX ORDER — ZOLPIDEM TARTRATE 10 MG/1
TABLET ORAL
Qty: 30 TAB | Refills: 5 | Status: SHIPPED | OUTPATIENT
Start: 2019-02-25 | End: 2019-09-23 | Stop reason: SDUPTHER

## 2019-02-25 NOTE — TELEPHONE ENCOUNTER
Last Visit: 11/1  Next Appt: None  Previous Refill Encounter: N/A    Requested Prescriptions     Pending Prescriptions Disp Refills    zolpidem (AMBIEN) 10 mg tablet 30 Tab 5     Sig: TAKE 1 TABLET BY MOUTH NIGHTLY AS NEEDED FOR SLEEP

## 2019-02-26 ENCOUNTER — DOCUMENTATION ONLY (OUTPATIENT)
Dept: FAMILY MEDICINE CLINIC | Age: 59
End: 2019-02-26

## 2019-06-24 ENCOUNTER — OFFICE VISIT (OUTPATIENT)
Dept: FAMILY MEDICINE CLINIC | Age: 59
End: 2019-06-24

## 2019-06-24 VITALS
DIASTOLIC BLOOD PRESSURE: 86 MMHG | WEIGHT: 250 LBS | RESPIRATION RATE: 20 BRPM | OXYGEN SATURATION: 100 % | BODY MASS INDEX: 35 KG/M2 | HEART RATE: 90 BPM | SYSTOLIC BLOOD PRESSURE: 145 MMHG | TEMPERATURE: 98 F | HEIGHT: 71 IN

## 2019-06-24 DIAGNOSIS — R25.2 MUSCLE CRAMPS: Primary | ICD-10-CM

## 2019-06-24 RX ORDER — QUININE SULFATE 324 MG/1
324 CAPSULE ORAL
Qty: 30 CAP | Refills: 3 | Status: SHIPPED | OUTPATIENT
Start: 2019-06-24 | End: 2019-07-24

## 2019-06-24 NOTE — PROGRESS NOTES
Chief Complaint   Patient presents with    Claudication     Pt state she is having crmps in marisel legs, marisel hands, feet chest and marisel calves. 1. Have you been to the ER, urgent care clinic since your last visit? Hospitalized since your last visit? No    2. Have you seen or consulted any other health care providers outside of the [facility] [place] since your last visit? Include any pap smears or colon screening.  No.

## 2019-06-24 NOTE — PROGRESS NOTES
HISTORY OF PRESENT ILLNESS  Abel Kaur is a 64 y.o. female. HPI Has been having intermittent muscle cramps- hands, feet, back. Cramps can last up to 2-3 minutes. Has been drinking some tonic water with quinine, tried lots of pickles. Was taking quinine 324 mg at night- was working fairly well. ROS    Physical Exam   Constitutional: She appears well-developed and well-nourished. HENT:   Right Ear: External ear normal.   Left Ear: External ear normal.   Mouth/Throat: Oropharynx is clear and moist.   Neck: No thyromegaly present. Cardiovascular: Normal rate, regular rhythm, normal heart sounds and intact distal pulses. Pulmonary/Chest: Breath sounds normal. She has no wheezes. Abdominal: Soft. Bowel sounds are normal. She exhibits no distension and no mass. There is no tenderness. Musculoskeletal: She exhibits no edema. Lymphadenopathy:     She has no cervical adenopathy. Nursing note and vitals reviewed. ASSESSMENT and PLAN  Orders Placed This Encounter    METABOLIC PANEL, COMPREHENSIVE    MAGNESIUM    CK    quiNINE 324 mg capsule     Diagnoses and all orders for this visit:    1. Muscle cramps  -     METABOLIC PANEL, COMPREHENSIVE  -     MAGNESIUM  -     CK    Other orders  -     quiNINE 324 mg capsule; Take 1 Cap by mouth nightly as needed (muscle cramps) for up to 30 days.

## 2019-06-25 LAB
ALBUMIN SERPL-MCNC: 4.2 G/DL (ref 3.5–5.5)
ALBUMIN/GLOB SERPL: 1.3 {RATIO} (ref 1.2–2.2)
ALP SERPL-CCNC: 171 IU/L (ref 39–117)
ALT SERPL-CCNC: 16 IU/L (ref 0–32)
AST SERPL-CCNC: 11 IU/L (ref 0–40)
BILIRUB SERPL-MCNC: <0.2 MG/DL (ref 0–1.2)
BUN SERPL-MCNC: 24 MG/DL (ref 6–24)
BUN/CREAT SERPL: 24 (ref 9–23)
CALCIUM SERPL-MCNC: 9.3 MG/DL (ref 8.7–10.2)
CHLORIDE SERPL-SCNC: 101 MMOL/L (ref 96–106)
CK SERPL-CCNC: 92 U/L (ref 24–173)
CO2 SERPL-SCNC: 28 MMOL/L (ref 20–29)
CREAT SERPL-MCNC: 0.99 MG/DL (ref 0.57–1)
GLOBULIN SER CALC-MCNC: 3.2 G/DL (ref 1.5–4.5)
GLUCOSE SERPL-MCNC: 104 MG/DL (ref 65–99)
MAGNESIUM SERPL-MCNC: 2.1 MG/DL (ref 1.6–2.3)
POTASSIUM SERPL-SCNC: 4.5 MMOL/L (ref 3.5–5.2)
PROT SERPL-MCNC: 7.4 G/DL (ref 6–8.5)
SODIUM SERPL-SCNC: 142 MMOL/L (ref 134–144)

## 2019-09-23 ENCOUNTER — OFFICE VISIT (OUTPATIENT)
Dept: FAMILY MEDICINE CLINIC | Age: 59
End: 2019-09-23

## 2019-09-23 VITALS
WEIGHT: 249.4 LBS | HEART RATE: 86 BPM | TEMPERATURE: 97.5 F | SYSTOLIC BLOOD PRESSURE: 135 MMHG | HEIGHT: 71 IN | RESPIRATION RATE: 18 BRPM | OXYGEN SATURATION: 97 % | DIASTOLIC BLOOD PRESSURE: 82 MMHG | BODY MASS INDEX: 34.92 KG/M2

## 2019-09-23 DIAGNOSIS — Z23 ENCOUNTER FOR IMMUNIZATION: Primary | ICD-10-CM

## 2019-09-23 DIAGNOSIS — F51.01 PRIMARY INSOMNIA: ICD-10-CM

## 2019-09-23 DIAGNOSIS — R25.2 MUSCLE CRAMPS: ICD-10-CM

## 2019-09-23 RX ORDER — ZOLPIDEM TARTRATE 10 MG/1
TABLET ORAL
Qty: 30 TAB | Refills: 5 | Status: SHIPPED | OUTPATIENT
Start: 2019-09-23 | End: 2020-02-24 | Stop reason: SDUPTHER

## 2019-09-23 RX ORDER — ZOLPIDEM TARTRATE 10 MG/1
TABLET ORAL
Qty: 30 TAB | Refills: 5 | Status: SHIPPED | OUTPATIENT
Start: 2019-09-23 | End: 2019-09-23 | Stop reason: SDUPTHER

## 2019-09-23 RX ORDER — QUININE SULFATE 324 MG/1
324 CAPSULE ORAL
COMMUNITY
End: 2020-06-22

## 2019-09-23 RX ORDER — ALBUTEROL SULFATE 90 UG/1
AEROSOL, METERED RESPIRATORY (INHALATION)
Qty: 3 INHALER | Refills: 12 | Status: SHIPPED | OUTPATIENT
Start: 2019-09-23 | End: 2020-10-22 | Stop reason: SDUPTHER

## 2019-09-23 RX ORDER — CALCIUM LACTATE 100 MG
300 TABLET ORAL 2 TIMES DAILY
Qty: 180 TAB | Refills: 12
Start: 2019-09-23 | End: 2021-06-28 | Stop reason: ALTCHOICE

## 2019-09-23 RX ORDER — CALCIUM LACTATE 100 MG
TABLET ORAL 2 TIMES DAILY
COMMUNITY
End: 2019-09-23

## 2019-09-23 NOTE — PROGRESS NOTES
HISTORY OF PRESENT ILLNESS  Kalina Ryan is a 64 y.o. female. HPI Has been having leg cramps in thighs, calves, arches. Cramps can last for 5 minutes, gets in bath tub, lies in hot water. Sweats profusely after episodes. Started taking calcium lactate- helped at first, but no longer working. Has tried calcium lactate bid, and has also tried quinine 324 mg . Symptoms are also triggered by stretching. Has been going for for a year now. ROS    Physical Exam   Constitutional: She appears well-developed and well-nourished. HENT:   Right Ear: External ear normal.   Left Ear: External ear normal.   Mouth/Throat: Oropharynx is clear and moist.   Neck: No thyromegaly present. Cardiovascular: Normal rate, regular rhythm, normal heart sounds and intact distal pulses. Pulmonary/Chest: Breath sounds normal. She has no wheezes. Abdominal: Soft. Bowel sounds are normal. She exhibits no distension and no mass. There is no tenderness. Musculoskeletal: She exhibits no edema. Lymphadenopathy:     She has no cervical adenopathy. Nursing note and vitals reviewed. ASSESSMENT and PLAN  Orders Placed This Encounter    Influenza virus vaccine (QUADRIVALENT PRES FREE SYRINGE) IM (35890)    Calcium Lactate 100 mg calcium tab    PROAIR HFA 90 mcg/actuation inhaler    zolpidem (AMBIEN) 10 mg tablet     Diagnoses and all orders for this visit:    1. Encounter for immunization  -     INFLUENZA VIRUS VAC QUAD,SPLIT,PRESV FREE SYRINGE IM    2. Primary insomnia  -     zolpidem (AMBIEN) 10 mg tablet; TAKE 1 TABLET BY MOUTH NIGHTLY AS NEEDED FOR SLEEP    3. Muscle cramps    Other orders  -     Calcium Lactate 100 mg calcium tab; Take 300 mg by mouth two (2) times a day. -     PROAIR HFA 90 mcg/actuation inhaler; USE 2 INHALATIONS EVERY 4 HOURS AS NEEDED FOR WHEEZING      Follow-up and Dispositions    · Return in about 6 weeks (around 11/4/2019).

## 2019-09-23 NOTE — TELEPHONE ENCOUNTER
----- Message from Leigh Macdonald sent at 9/23/2019  4:02 PM EDT -----  Regarding: Dr. Bladimir Fitzgerald request for her refill of \"Ambien\" 10 mg to be sent to her [facility] at [phone]. Best contact number is [phone].

## 2019-09-23 NOTE — PROGRESS NOTES
Chief Complaint   Patient presents with    Other     paperwork    Claudication       Pt brought in fmla paperwork to be completed . Also continues with leg cramps.medication not working .

## 2019-09-25 ENCOUNTER — DOCUMENTATION ONLY (OUTPATIENT)
Dept: FAMILY MEDICINE CLINIC | Age: 59
End: 2019-09-25

## 2019-11-01 RX ORDER — LEVOTHYROXINE SODIUM 100 UG/1
TABLET ORAL
Qty: 90 TAB | Refills: 2 | Status: SHIPPED | OUTPATIENT
Start: 2019-11-01 | End: 2020-08-27 | Stop reason: SDUPTHER

## 2019-11-04 ENCOUNTER — HOSPITAL ENCOUNTER (OUTPATIENT)
Dept: MAMMOGRAPHY | Age: 59
Discharge: HOME | End: 2019-11-04
Attending: OBSTETRICS & GYNECOLOGY
Payer: COMMERCIAL

## 2019-11-04 DIAGNOSIS — Z12.31 VISIT FOR SCREENING MAMMOGRAM: ICD-10-CM

## 2019-11-04 PROCEDURE — 77063 BREAST TOMOSYNTHESIS BI: CPT

## 2020-02-24 ENCOUNTER — OFFICE VISIT (OUTPATIENT)
Dept: OBGYN CLINIC | Age: 60
End: 2020-02-24

## 2020-02-24 VITALS
SYSTOLIC BLOOD PRESSURE: 139 MMHG | BODY MASS INDEX: 34.66 KG/M2 | DIASTOLIC BLOOD PRESSURE: 92 MMHG | HEART RATE: 78 BPM | RESPIRATION RATE: 18 BRPM | HEIGHT: 71 IN | WEIGHT: 247.6 LBS

## 2020-02-24 DIAGNOSIS — Z01.419 ENCOUNTER FOR GYNECOLOGICAL EXAMINATION WITHOUT ABNORMAL FINDING: Primary | ICD-10-CM

## 2020-02-24 DIAGNOSIS — F51.01 PRIMARY INSOMNIA: ICD-10-CM

## 2020-02-24 DIAGNOSIS — Z12.31 VISIT FOR SCREENING MAMMOGRAM: ICD-10-CM

## 2020-02-24 DIAGNOSIS — Z78.0 MENOPAUSE: ICD-10-CM

## 2020-02-24 RX ORDER — ZOLPIDEM TARTRATE 10 MG/1
TABLET ORAL
Qty: 30 TAB | Refills: 5 | Status: SHIPPED | OUTPATIENT
Start: 2020-02-24 | End: 2020-08-27 | Stop reason: SDUPTHER

## 2020-02-24 NOTE — PROGRESS NOTES
Chief Complaint   Patient presents with    Well Woman     Last Pap 2/2019. Mammogram 11/2019. Pt voices no complaints.

## 2020-02-24 NOTE — PATIENT INSTRUCTIONS
Learning About Breast Cancer Screening  What is breast cancer screening? Breast cancer occurs when cells that are not normal grow in one or both of your breasts. Screening tests can help find breast cancer early. Cancer is easier to treat when it's found early. Having concerns about breast cancer is common. That's why it's important to talk with your doctor about when to start and how often to get screened for breast cancer. How is breast cancer screening done? Several screening tests can be used to check for breast cancer. · Mammograms check for signs of cancer using X-rays. They can show tumors that are too small for you or your doctor to feel. During a mammogram, a machine squeezes your breasts to make them flatter and easier to X-ray. At least two pictures are taken of each breast. One is taken from the top and one from the side. · 3-D mammograms are also called digital breast tomosynthesis. Your breast is positioned on a flat plate. A top plate is pressed against your breast to keep it in position. The X-ray arm then moves in an arc above the breast and takes many pictures. A computer uses these X-rays to create a three-dimensional image. · Clinical breast exams are a doctor's exam. Your doctor carefully feels your breasts and under your arms to check for lumps or other changes. After the screening, your doctor will tell you the results. You will also be told if you need any follow-up tests. When should you get screened? Talk with your doctor about when you should start being tested for breast cancer. How often you get tested and the kind of tests you get will depend on your age and your risk. The guidelines that follow are for women who have an average risk for breast cancer. If you have a higher risk for breast cancer, such as having a family history of breast cancer in multiple relatives or at a young age, your doctor may recommend different screening for you.   · Ages 21 to 44: Some experts recommend that women have a clinical breast exam every 3 years, starting at age 21. Ask your doctor how often you should have this test. If you have a high risk for breast cancer, talk with your doctor about when to start yearly mammograms and other screening tests. · Ages 36 and older: Talk with your doctor about how often you should have mammograms and clinical breast exams. What is your risk for breast cancer? If you don't already know your risk of breast cancer, you can ask your doctor about it. You can also look it up at www.cancer.gov/bcrisktool/. If your doctor says that you have a high or very high risk, ask about ways to reduce your risk. These could include getting extra screening, taking medicine, or having surgery. If you have a strong family history of breast cancer, ask your doctor about genetic testing. What steps can you take to stay healthy? Some things that increase your risk of breast cancer, such as your age and being female, cannot be controlled. But you can do some things to stay as healthy as you can. · Learn what your breasts normally look and feel like. If you notice any changes, tell your doctor. · If you drink alcohol, limit how much you drink. Any amount of alcohol may increase your risk for some types of cancer. · If you smoke, quit. When you quit smoking, you lower your chances of getting many types of cancer. You can also do your best to eat well, be active, and stay at a healthy weight. Eating healthy foods and being active every day, as well as staying at a healthy weight, may help prevent cancer. Where can you learn more? Go to http://james-kaz.info/. Enter C278 in the search box to learn more about \"Learning About Breast Cancer Screening. \"  Current as of: December 19, 2018  Content Version: 12.2  © 1097-6149 [employer].  Care instructions adapted under license by [employer] (which disclaims liability or warranty for this information). If you have questions about a medical condition or this instruction, always ask your healthcare professional. [place] [address] any warranty or liability for your use of this information. Well Visit, Women 48 to 72: Care Instructions  Your Care Instructions    Physical exams can help you stay healthy. Your doctor has checked your overall health and may have suggested ways to take good care of yourself. He or she also may have recommended tests. At home, you can help prevent illness with healthy eating, regular exercise, and other steps. Follow-up care is a key part of your treatment and safety. Be sure to make and go to all appointments, and call your doctor if you are having problems. It's also a good idea to know your test results and keep a list of the medicines you take. How can you care for yourself at home? · Reach and stay at a healthy weight. This will lower your risk for many problems, such as obesity, diabetes, heart disease, and high blood pressure. · Get at least 30 minutes of exercise on most days of the week. Walking is a good choice. You also may want to do other activities, such as running, swimming, cycling, or playing tennis or team sports. · Do not smoke. Smoking can make health problems worse. If you need help quitting, talk to your doctor about stop-smoking programs and medicines. These can increase your chances of quitting for good. · Protect your skin from too much sun. When you're outdoors from 10 a.m. to 4 p.m., stay in the shade or cover up with clothing and a hat with a wide brim. Wear sunglasses that block UV rays. Even when it's cloudy, put broad-spectrum sunscreen (SPF 30 or higher) on any exposed skin. · See a dentist one or two times a year for checkups and to have your teeth cleaned. · Wear a seat belt in the car. Follow your doctor's advice about when to have certain tests. These tests can spot problems early. · Cholesterol.  Your doctor will tell you how often to have this done based on your age, family history, or other things that can increase your risk for heart attack and stroke. · Blood pressure. Have your blood pressure checked during a routine doctor visit. Your doctor will tell you how often to check your blood pressure based on your age, your blood pressure results, and other factors. · Mammogram. Ask your doctor how often you should have a mammogram, which is an X-ray of your breasts. A mammogram can spot breast cancer before it can be felt and when it is easiest to treat. · Pap test and pelvic exam. Ask your doctor how often you should have a Pap test. You may not need to have a Pap test as often as you used to. · Vision. Have your eyes checked every year or two or as often as your doctor suggests. Some experts recommend that you have yearly exams for glaucoma and other age-related eye problems starting at age 48. · Hearing. Tell your doctor if you notice any change in your hearing. You can have tests to find out how well you hear. · Diabetes. Ask your doctor whether you should have tests for diabetes. · Colorectal cancer. Your risk for colorectal cancer gets higher as you get older. Some experts say that adults should start regular screening at age 48 and stop at age 76. Others say to start before age 48 or continue after age 76. Talk with your doctor about your risk and when to start and stop screening. · Thyroid disease. Talk to your doctor about whether to have your thyroid checked as part of a regular physical exam. Women have an increased chance of a thyroid problem. · Osteoporosis. You should begin tests for bone density at age 72. If you are younger than 72, ask your doctor whether you have factors that may increase your risk for this disease. You may want to have this test before age 72. · Heart attack and stroke risk. At least every 4 to 6 years, you should have your risk for heart attack and stroke assessed. Your doctor uses factors such as your age, blood pressure, cholesterol, and whether you smoke or have diabetes to show what your risk for a heart attack or stroke is over the next 10 years. When should you call for help? Watch closely for changes in your health, and be sure to contact your doctor if you have any problems or symptoms that concern you. Where can you learn more? Go to http://james-kaz.info/. Enter A754 in the search box to learn more about \"Well Visit, Women 50 to 72: Care Instructions. \"  Current as of: December 13, 2018  Content Version: 12.2  © 7525-6884 [employer]. Care instructions adapted under license by [employer] (which disclaims liability or warranty for this information). If you have questions about a medical condition or this instruction, always ask your healthcare professional. [address] any warranty or liability for your use of this information.

## 2020-02-24 NOTE — PROGRESS NOTES
Annual exam ages 40-58    Sonia  is a ,  64 y.o. female [address] Patient's last menstrual period was 2012. .    She presents for her annual checkup. She is having no significant problems. With regard to the Gardasil vaccine, she is older than the age for which it is FDA approved. Menstrual status:    Postmenopausal.     Occasional hot flashes and night sweats, which are tolerable. Has been on HRT in the past and patient did not like it. Sexual history:    She  reports previously being sexually active. Medical conditions:    Since her last annual GYN exam about one year ago, she has not the following changes in her health history: none. Pap and Mammogram History:    Her most recent Pap smear was 2019 - normal.  No hx of abnormal    The patient with normal mammogram 2019. Breast Cancer History/Substance Abuse: negative    Osteoporosis History:    Family history does not include a first or second degree relative with osteopenia or osteoporosis. She is currently taking calcium and vit D. Past Medical History:   Diagnosis Date    Anxiety and depression     Asthma     Hyperthyroidism     s/p 32.1 mCi on 12 for toxic multinodular goiter    Other postablative hypothyroidism 2012     Past Surgical History:   Procedure Laterality Date    HX BUNIONECTOMY      bilateral    HX CHOLECYSTECTOMY      HX COLONOSCOPY  - Dr. Pool Richards-    normal    [facility]    HX TONSILLECTOMY         Current Outpatient Medications   Medication Sig Dispense Refill    levothyroxine (SYNTHROID) 100 mcg tablet TAKE 1 TABLET BY MOUTH EVERY DAY BEFORE BREAKFAST 90 Tab 2    quiNINE (QUALAQUIN) 324 mg capsule Take 324 mg by mouth nightly as needed.  Calcium Lactate 100 mg calcium tab Take 300 mg by mouth two (2) times a day.  180 Tab 12    PROAIR HFA 90 mcg/actuation inhaler USE 2 INHALATIONS EVERY 4 HOURS AS NEEDED FOR WHEEZING 3 Inhaler 12    zolpidem (AMBIEN) 10 mg tablet TAKE 1 TABLET BY MOUTH EVERY NIGHT AS NEEDED FOR SLEEP 30 Tab 5    vitamin e (E GEMS) 1,000 unit capsule Take 1 Cap by mouth daily. One at bedtime for leg cramps. 30 Cap 12     Allergies: Nuts [tree nut]     Tobacco History:  reports that she has never smoked. She has never used smokeless tobacco.  Alcohol Abuse:  reports no history of alcohol use. Drug Abuse:  reports no history of drug use. Patient feels safe at home.     Family Medical/Cancer History:   Family History   Problem Relation Age of Onset    Thyroid Disease Mother     Hypertension Mother         Review of Systems - History obtained from the patient  Constitutional: negative for weight loss, fever, night sweats  HEENT: negative for hearing loss, earache, congestion, snoring, sorethroat  CV: negative for chest pain, palpitations, edema  Resp: negative for cough, shortness of breath, wheezing  GI: negative for change in bowel habits, abdominal pain, black or bloody stools  : negative for frequency, dysuria, hematuria, vaginal discharge  MSK: negative for back pain, joint pain, muscle pain  Breast: negative for breast lumps, nipple discharge, galactorrhea  Skin :negative for itching, rash, hives  Neuro: negative for dizziness, headache, confusion, weakness  Psych: negative for anxiety, depression, change in mood  Heme/lymph: negative for bleeding, bruising, pallor    Physical Exam    Visit Vitals  BP (!) 139/92 (BP 1 Location: Right arm, BP Patient Position: Sitting)   Pulse 78   Resp 18   Ht 5' 11\" (1.803 m)   Wt 247 lb 9.6 oz (112.3 kg)   LMP 01/13/2012   BMI 34.53 kg/m²       Constitutional  · Appearance: well-nourished, well developed, alert, in no acute distress    HENT  · Head and Face: appears normal    Neck  · Inspection/Palpation: normal appearance, no masses or tenderness  · Lymph Nodes: no lymphadenopathy present  · Thyroid: gland size normal, nontender, no nodules or masses present on palpation    Chest  · Respiratory Effort: breathing unlabored  · Auscultation: normal breath sounds    Cardiovascular  · Heart:  · Auscultation: regular rate and rhythm without murmur    Breasts  · Inspection of Breasts: breasts symmetrical, no skin changes, no discharge present, nipple appearance normal, no skin retraction present  · Palpation of Breasts and Axillae: no masses present on palpation, no breast tenderness  · Axillary Lymph Nodes: no lymphadenopathy present    Gastrointestinal  · Abdominal Examination: abdomen non-tender to palpation, normal bowel sounds, no masses present  · Liver and spleen: no hepatomegaly present, spleen not palpable  · Hernias: no hernias identified    Genitourinary  · External Genitalia: normal appearance for age, no discharge present, no tenderness present, no inflammatory lesions present, no masses present, no atrophy present  · Vagina: normal vaginal vault without central or paravaginal defects, no discharge present, no inflammatory lesions present, no masses present  · Bladder: non-tender to palpation  · Urethra: appears normal  · Cervix: normal   · Uterus: normal size, shape and consistency  · Adnexa: no adnexal tenderness present, no adnexal masses present  · Perineum: perineum within normal limits, no evidence of trauma, no rashes or skin lesions present  · Anus: anus within normal limits, no hemorrhoids present  · Inguinal Lymph Nodes: no lymphadenopathy present    Skin  · General Inspection: no rash, no lesions identified    Neurologic/Psychiatric  · Mental Status:  · Orientation: grossly oriented to person, place and time  · Mood and Affect: mood normal, affect appropriate    Assessment:  Routine gynecologic examination  Her current medical status is satisfactory with no evidence of significant gynecologic issues.     Plan:  - pap smear up to date  - mammogram up to date  - ambien PRN    Counseled re: diet, exercise, healthy lifestyle  Return for yearly wellness visits  Rec annual mammogram

## 2020-06-22 RX ORDER — QUININE SULFATE 324 MG/1
CAPSULE ORAL
Qty: 30 CAP | Refills: 3 | Status: SHIPPED | OUTPATIENT
Start: 2020-06-22 | End: 2021-06-28 | Stop reason: SDUPTHER

## 2020-06-24 ENCOUNTER — TELEPHONE (OUTPATIENT)
Dept: FAMILY MEDICINE CLINIC | Age: 60
End: 2020-06-24

## 2020-06-24 NOTE — TELEPHONE ENCOUNTER
Left message for patient to call back to make an appointment for routine follow up care and to discuss leg problems

## 2020-06-24 NOTE — TELEPHONE ENCOUNTER
Pt wants fluid taken off of her leg - states it has happened before       Best number to reach her is [phone]

## 2020-08-24 ENCOUNTER — TELEPHONE (OUTPATIENT)
Dept: OBGYN CLINIC | Age: 60
End: 2020-08-24

## 2020-08-24 NOTE — TELEPHONE ENCOUNTER
Patient has been advised and will schedule with her PCP to discuss the Ambien, as she understands this is not a typical GYN medication. She wants to schedule for AE for 2021- we set the date for 2/26/21 @ 9 am with Juan R.

## 2020-08-24 NOTE — TELEPHONE ENCOUNTER
Former patient of DB    Sending message to covering MD, Roc Casillas. Patient was last seen for AE on 2/24/20 and at that time it is notated to have her take Ambien 10 mg  PRN. The patient is calling to say that she got a call from the pharmacy that Dr. Chaparro Graff' LAN number no longer works and she wants me to call them. I know you typically won't prescribe without seeing patient, do you want me to advise this or is it okay to call the pharmacy with your LAN information?     [facility] [address]

## 2020-08-27 DIAGNOSIS — F51.01 PRIMARY INSOMNIA: ICD-10-CM

## 2020-08-27 RX ORDER — ZOLPIDEM TARTRATE 10 MG/1
TABLET ORAL
Qty: 30 TAB | Refills: 5 | Status: SHIPPED | OUTPATIENT
Start: 2020-08-27 | End: 2021-03-24 | Stop reason: SDUPTHER

## 2020-08-27 RX ORDER — LEVOTHYROXINE SODIUM 100 UG/1
TABLET ORAL
Qty: 90 TAB | Refills: 2 | Status: SHIPPED | OUTPATIENT
Start: 2020-08-27 | End: 2021-06-25

## 2020-08-27 NOTE — TELEPHONE ENCOUNTER
Last visit:9/23/19  Next visit: 9/16/20  Previous refill 11/01/19(90+2R)    Please review  for Ambien before prescribing new Rx     Thanks,  Sasha    Requested Prescriptions     Pending Prescriptions Disp Refills    levothyroxine (SYNTHROID) 100 mcg tablet 90 Tab 2     Sig: TAKE 1 TABLET BY MOUTH EVERY DAY BEFORE BREAKFAST    zolpidem (AMBIEN) 10 mg tablet 30 Tab 5     Sig: TAKE 1 TABLET BY MOUTH EVERY NIGHT AS NEEDED FOR SLEEP

## 2020-09-16 ENCOUNTER — OFFICE VISIT (OUTPATIENT)
Dept: FAMILY MEDICINE CLINIC | Age: 60
End: 2020-09-16
Payer: COMMERCIAL

## 2020-09-16 ENCOUNTER — TELEPHONE (OUTPATIENT)
Dept: FAMILY MEDICINE CLINIC | Age: 60
End: 2020-09-16

## 2020-09-16 VITALS
OXYGEN SATURATION: 98 % | WEIGHT: 260.2 LBS | DIASTOLIC BLOOD PRESSURE: 90 MMHG | TEMPERATURE: 97.5 F | SYSTOLIC BLOOD PRESSURE: 152 MMHG | BODY MASS INDEX: 36.29 KG/M2 | HEART RATE: 83 BPM | RESPIRATION RATE: 16 BRPM

## 2020-09-16 DIAGNOSIS — Z23 NEEDS FLU SHOT: ICD-10-CM

## 2020-09-16 DIAGNOSIS — E66.09 CLASS 2 OBESITY DUE TO EXCESS CALORIES WITH BODY MASS INDEX (BMI) OF 36.0 TO 36.9 IN ADULT, UNSPECIFIED WHETHER SERIOUS COMORBIDITY PRESENT: Primary | ICD-10-CM

## 2020-09-16 DIAGNOSIS — E03.9 ACQUIRED HYPOTHYROIDISM: ICD-10-CM

## 2020-09-16 DIAGNOSIS — E66.01 SEVERE OBESITY (HCC): ICD-10-CM

## 2020-09-16 PROCEDURE — 99213 OFFICE O/P EST LOW 20 MIN: CPT | Performed by: FAMILY MEDICINE

## 2020-09-16 RX ORDER — MELOXICAM 15 MG/1
15 TABLET ORAL
COMMUNITY
Start: 2020-09-02

## 2020-09-16 NOTE — PROGRESS NOTES
Chief Complaint   Patient presents with    Follow-up    Form Completion     fmla     1. Have you been to the ER, urgent care clinic since your last visit? Hospitalized since your last visit? no    2. Have you seen or consulted any other health care providers outside of the [address] [place] [address] since your last visit? Include any pap smears or colon screening. Yes spec    Verbal Order received from Dr. Lorena Maynard for influenza  given IM  in left arm.

## 2020-09-16 NOTE — PROGRESS NOTES
HISTORY OF PRESENT ILLNESS  Thierry Burdick is a 64 y.o. female. HPI    ROS    Physical Exam    ASSESSMENT and PLAN  Orders Placed This Encounter    Influenza Virus Vaccine QUAD, PF Syr 6 Months + (Flulaval, Fluarix 46172)    CBC WITH AUTOMATED DIFF    TSH 3RD GENERATION    T4,FREE(DIRECT)    LIPID PANEL    METABOLIC PANEL, COMPREHENSIVE    naltrexone-buPROPion (CONTRAVE) 8-90 mg TbER ER tablet     Sig: Week 1 1 tab PO QAM, Week 2 1QAM 1QHS, Week 3 2QAM 1 QHS, Week 4 & beyond 2QAM 2QHS     Dispense:  120 Tab     Refill:  5     Orders Placed This Encounter    Influenza Virus Vaccine QUAD, PF Syr 6 Months + (Flulaval, Fluarix 12373)    CBC WITH AUTOMATED DIFF    TSH 3RD GENERATION    T4,FREE(DIRECT)    LIPID PANEL    METABOLIC PANEL, COMPREHENSIVE    naltrexone-buPROPion (CONTRAVE) 8-90 mg TbER ER tablet     Diagnoses and all orders for this visit:    1. Class 2 obesity due to excess calories with body mass index (BMI) of 36.0 to 36.9 in adult, unspecified whether serious comorbidity present  -     CBC WITH AUTOMATED DIFF  -     LIPID PANEL  -     METABOLIC PANEL, COMPREHENSIVE    2. Needs flu shot  -     INFLUENZA VIRUS VAC QUAD,SPLIT,PRESV FREE SYRINGE IM    3. Severe obesity ([address]    4.  Acquired hypothyroidism  -     TSH 3RD GENERATION  -     T4,FREE(DIRECT)    Other orders  -     naltrexone-buPROPion (CONTRAVE) 8-90 mg TbER ER tablet; Week 1 1 tab PO QAM, Week 2 1QAM 1QHS, Week 3 2QAM 1 QHS, Week 4 & beyond 2QAM 2QHS

## 2020-09-16 NOTE — PROGRESS NOTES
HISTORY OF PRESENT ILLNESS  Mejia Decker is a 64 y.o. female. HPI  Trying to lose weight. Phentermine made her very jittery. Has also been having leg cramps. Will come on every nigth for 4-5 days, then goes several months without them. Not really interested in gastric surgery . UTD on mammograms, colonoscopy, Paps. ROS    Physical Exam  Vitals signs and nursing note reviewed. Constitutional:       Appearance: She is well-developed. HENT:      Right Ear: External ear normal.      Left Ear: External ear normal.   Neck:      Thyroid: No thyromegaly. Cardiovascular:      Rate and Rhythm: Normal rate and regular rhythm. Heart sounds: Normal heart sounds. Pulmonary:      Breath sounds: Normal breath sounds. No wheezing. Abdominal:      General: Bowel sounds are normal. There is no distension. Palpations: Abdomen is soft. There is no mass. Tenderness: There is no abdominal tenderness. Lymphadenopathy:      Cervical: No cervical adenopathy. ASSESSMENT and PLAN  Orders Placed This Encounter    Influenza Vaccine, QUAD, PF, 4 Yrs + (Flucelvax 06250)    CBC WITH AUTOMATED DIFF    TSH 3RD GENERATION    T4,FREE(DIRECT)    LIPID PANEL    METABOLIC PANEL, COMPREHENSIVE    naltrexone-buPROPion (CONTRAVE) 8-90 mg TbER ER tablet     Diagnoses and all orders for this visit:    1. Class 2 obesity due to excess calories with body mass index (BMI) of 36.0 to 36.9 in adult, unspecified whether serious comorbidity present  -     CBC WITH AUTOMATED DIFF  -     LIPID PANEL  -     METABOLIC PANEL, COMPREHENSIVE    2. Needs flu shot  -     INFLUENZA, INJECTABLE, MDCK, PRESERVATIVE FREE, QUADRIVALENT    3. Severe obesity ([address]    4.  Acquired hypothyroidism  -     TSH 3RD GENERATION  -     T4,FREE(DIRECT)    Other orders  -     naltrexone-buPROPion (CONTRAVE) 8-90 mg TbER ER tablet; Week 1 1 tab PO QAM, Week 2 1QAM 1QHS, Week 3 2QAM 1 QHS, Week 4 & beyond 2QAM 2QHS

## 2020-09-17 ENCOUNTER — DOCUMENTATION ONLY (OUTPATIENT)
Dept: FAMILY MEDICINE CLINIC | Age: 60
End: 2020-09-17

## 2020-09-17 LAB
ALBUMIN SERPL-MCNC: 4.6 G/DL (ref 3.8–4.9)
ALBUMIN/GLOB SERPL: 1.7 {RATIO} (ref 1.2–2.2)
ALP SERPL-CCNC: 127 IU/L (ref 39–117)
ALT SERPL-CCNC: 28 IU/L (ref 0–32)
AST SERPL-CCNC: 29 IU/L (ref 0–40)
BASOPHILS # BLD AUTO: 0 X10E3/UL (ref 0–0.2)
BASOPHILS NFR BLD AUTO: 1 %
BILIRUB SERPL-MCNC: 0.2 MG/DL (ref 0–1.2)
BUN SERPL-MCNC: 15 MG/DL (ref 6–24)
BUN/CREAT SERPL: 18 (ref 9–23)
CALCIUM SERPL-MCNC: 9.2 MG/DL (ref 8.7–10.2)
CHLORIDE SERPL-SCNC: 104 MMOL/L (ref 96–106)
CHOLEST SERPL-MCNC: 205 MG/DL (ref 100–199)
CO2 SERPL-SCNC: 25 MMOL/L (ref 20–29)
CREAT SERPL-MCNC: 0.82 MG/DL (ref 0.57–1)
EOSINOPHIL # BLD AUTO: 0.3 X10E3/UL (ref 0–0.4)
EOSINOPHIL NFR BLD AUTO: 6 %
ERYTHROCYTE [DISTWIDTH] IN BLOOD BY AUTOMATED COUNT: 13 % (ref 11.7–15.4)
GLOBULIN SER CALC-MCNC: 2.7 G/DL (ref 1.5–4.5)
GLUCOSE SERPL-MCNC: 138 MG/DL (ref 65–99)
HCT VFR BLD AUTO: 38.3 % (ref 34–46.6)
HDLC SERPL-MCNC: 52 MG/DL
HGB BLD-MCNC: 12.8 G/DL (ref 11.1–15.9)
IMM GRANULOCYTES # BLD AUTO: 0 X10E3/UL (ref 0–0.1)
IMM GRANULOCYTES NFR BLD AUTO: 0 %
INTERPRETATION, 910389: NORMAL
LDLC SERPL CALC-MCNC: 120 MG/DL (ref 0–99)
LYMPHOCYTES # BLD AUTO: 1.7 X10E3/UL (ref 0.7–3.1)
LYMPHOCYTES NFR BLD AUTO: 38 %
MCH RBC QN AUTO: 27.9 PG (ref 26.6–33)
MCHC RBC AUTO-ENTMCNC: 33.4 G/DL (ref 31.5–35.7)
MCV RBC AUTO: 83 FL (ref 79–97)
MONOCYTES # BLD AUTO: 0.4 X10E3/UL (ref 0.1–0.9)
MONOCYTES NFR BLD AUTO: 8 %
NEUTROPHILS # BLD AUTO: 2.1 X10E3/UL (ref 1.4–7)
NEUTROPHILS NFR BLD AUTO: 47 %
PLATELET # BLD AUTO: 301 X10E3/UL (ref 150–450)
POTASSIUM SERPL-SCNC: 4.1 MMOL/L (ref 3.5–5.2)
PROT SERPL-MCNC: 7.3 G/DL (ref 6–8.5)
RBC # BLD AUTO: 4.59 X10E6/UL (ref 3.77–5.28)
SODIUM SERPL-SCNC: 143 MMOL/L (ref 134–144)
T4 FREE SERPL-MCNC: 1.23 NG/DL (ref 0.82–1.77)
TRIGL SERPL-MCNC: 190 MG/DL (ref 0–149)
TSH SERPL DL<=0.005 MIU/L-ACNC: 0.62 UIU/ML (ref 0.45–4.5)
VLDLC SERPL CALC-MCNC: 33 MG/DL (ref 5–40)
WBC # BLD AUTO: 4.4 X10E3/UL (ref 3.4–10.8)

## 2020-09-17 PROCEDURE — 90471 IMMUNIZATION ADMIN: CPT

## 2020-09-17 PROCEDURE — 90686 IIV4 VACC NO PRSV 0.5 ML IM: CPT

## 2020-09-17 NOTE — PROGRESS NOTES
INSURANCE PREFERRED:  BENZPHETAMINE HCL  DIETHYLPROPION HCL  PHENTERMINE HCL    Submitted PA through covermymeds (KEY: [id number]).

## 2020-09-21 NOTE — PROGRESS NOTES
pc with pt. Had just eaten cheese cake prior to last blood test. Will check fasting blood sugar and A1C in 3 months. To try to lose weight also.

## 2020-10-14 ENCOUNTER — TRANSCRIBE ORDER (OUTPATIENT)
Dept: SCHEDULING | Age: 60
End: 2020-10-14

## 2020-10-14 DIAGNOSIS — Z12.31 ENCOUNTER FOR SCREENING MAMMOGRAM FOR MALIGNANT NEOPLASM OF BREAST: Primary | ICD-10-CM

## 2020-10-22 RX ORDER — ALBUTEROL SULFATE 90 UG/1
AEROSOL, METERED RESPIRATORY (INHALATION)
Qty: 3 INHALER | Refills: 12 | Status: SHIPPED | OUTPATIENT
Start: 2020-10-22 | End: 2021-06-28 | Stop reason: SDUPTHER

## 2020-10-22 NOTE — TELEPHONE ENCOUNTER
Last visit:9/16/20  Next visit: not scheduled  Previous refill 9/23/19(3inhaler +12R)    Requested Prescriptions     Pending Prescriptions Disp Refills    ProAir HFA 90 mcg/actuation inhaler 3 Inhaler 12     Sig: USE 2 INHALATIONS EVERY 4 HOURS AS NEEDED FOR WHEEZING

## 2020-12-14 ENCOUNTER — HOSPITAL ENCOUNTER (OUTPATIENT)
Dept: MAMMOGRAPHY | Age: 60
Discharge: HOME | End: 2020-12-14
Payer: COMMERCIAL

## 2020-12-14 DIAGNOSIS — Z12.31 ENCOUNTER FOR SCREENING MAMMOGRAM FOR MALIGNANT NEOPLASM OF BREAST: ICD-10-CM

## 2020-12-14 PROCEDURE — 77063 BREAST TOMOSYNTHESIS BI: CPT

## 2021-03-10 ENCOUNTER — DOCUMENTATION ONLY (OUTPATIENT)
Dept: FAMILY MEDICINE CLINIC | Age: 61
End: 2021-03-10

## 2021-03-29 ENCOUNTER — OFFICE VISIT (OUTPATIENT)
Dept: OBGYN CLINIC | Age: 61
End: 2021-03-29
Payer: COMMERCIAL

## 2021-03-29 VITALS
SYSTOLIC BLOOD PRESSURE: 122 MMHG | HEIGHT: 71 IN | WEIGHT: 255 LBS | DIASTOLIC BLOOD PRESSURE: 78 MMHG | BODY MASS INDEX: 35.7 KG/M2

## 2021-03-29 DIAGNOSIS — N95.1 MENOPAUSAL SYNDROME: ICD-10-CM

## 2021-03-29 DIAGNOSIS — Z01.419 ENCOUNTER FOR GYNECOLOGICAL EXAMINATION WITHOUT ABNORMAL FINDING: Primary | ICD-10-CM

## 2021-03-29 DIAGNOSIS — G47.00 INSOMNIA, UNSPECIFIED TYPE: ICD-10-CM

## 2021-03-29 PROCEDURE — 99396 PREV VISIT EST AGE 40-64: CPT | Performed by: OBSTETRICS & GYNECOLOGY

## 2021-03-29 RX ORDER — PAROXETINE 10 MG/1
10 TABLET, FILM COATED ORAL
Qty: 90 TAB | Refills: 3 | Status: SHIPPED | OUTPATIENT
Start: 2021-03-29 | End: 2021-06-27

## 2021-03-29 NOTE — PROGRESS NOTES
Annual exam    Angel Peres is a 63 y.o. presenting for annual exam.     Her main concerns today include hot flashes, night sweats, and insomnia. Used HRT in the past but did not like the way it made her feel. Interested in non-hormonal options for management of hot flashes. Has also used multiple OTC supplements like melatonin, benadryl etc, without relief of insomnia. Only other concern today is leg cramping. She does have thyroid disease but reports thyroid function has been well controlled. Pt has worked at [employer] for 30+ years! Former pt of Dr. Kristina Fung. Ob/Gyn Hx:   A1 -2  1 EAB  Menopause- age 50  ? PMB-denies  ? VMS-present  ? Vag dryness-denies  ? HRT-denies  STI- denies  ? SA-not currently    Health maintenance:  Pap-19 NILM  Mammo-20 B1  Colonoscopy-  normal    Past Medical History:   Diagnosis Date    Anxiety and depression     Asthma     Hyperthyroidism     s/p 32.1 mCi on 12 for toxic multinodular goiter    Other postablative hypothyroidism 2012       Past Surgical History:   Procedure Laterality Date    HX BUNIONECTOMY      bilateral    HX CHOLECYSTECTOMY      HX COLONOSCOPY  - Dr. Мария Puente-    normal    HX LAP [facility]LECYSTECTOMY  16    [facility]    HX TONSILLECTOMY         Family History   Problem Relation Age of Onset    Thyroid Disease Mother     Hypertension Mother        Social History     Socioeconomic History    Marital status: SINGLE     Spouse name: Not on file    Number of children: Not on file    Years of education: Not on file    Highest education level: Not on file   Occupational History    Not on file   Social Needs    Financial resource strain: Not on file    Food insecurity     Worry: Not on file     Inability: Not on file    Transportation needs     Medical: Not on file     Non-medical: Not on file   Tobacco Use    Smoking status: Never Smoker    Smokeless tobacco: Never Used   Substance and Sexual Activity    Alcohol use: No    Drug use: No    Sexual activity: Not Currently   Lifestyle    Physical activity     Days per week: Not on file     Minutes per session: Not on file    Stress: Not on file   Relationships    Social connections     Talks on phone: Not on file     Gets together: Not on file     Attends Rastafarian service: Not on file     Active member of club or organization: Not on file     Attends meetings of clubs or organizations: Not on file     Relationship status: Not on file    Intimate partner violence     Fear of current or ex partner: Not on file     Emotionally abused: Not on file     Physically abused: Not on file     Forced sexual activity: Not on file   Other Topics Concern    Not on file   Social History Narrative    Lives in [address] [place] [address],[address] alone but daughter stays frequently. Also has 1 son. Works for [employer] [place]. Has a 3year old grandson that lives with her, and pts daughter, who is the mother                   Current Outpatient Medications   Medication Sig Dispense Refill    ProAir HFA 90 mcg/actuation inhaler USE 2 INHALATIONS EVERY 4 HOURS AS NEEDED FOR WHEEZING 3 Inhaler 12    levothyroxine (SYNTHROID) 100 mcg tablet TAKE 1 TABLET BY MOUTH EVERY DAY BEFORE BREAKFAST 90 Tab 2    zolpidem (AMBIEN) 10 mg tablet TAKE 1 TABLET BY MOUTH EVERY NIGHT AS NEEDED FOR SLEEP 30 Tab 1    meloxicam (MOBIC) 15 mg tablet TAKE 1/2   1 TABLET BY MOUTH DAILY WITH FOOD FOR INFLAMMATION      naltrexone-buPROPion (CONTRAVE) 8-90 mg TbER ER tablet Week 1 1 tab PO QAM, Week 2 1QAM 1QHS, Week 3 2QAM 1 QHS, Week 4 & beyond 2QAM 2QHS 120 Tab 5    quiNINE 324 mg capsule TAKE 1 CAP BY MOUTH NIGHTLY AS NEEDED (MUSCLE CRAMPS) FOR UP TO 30 DAYS 30 Cap 3    Calcium Lactate 100 mg calcium tab Take 300 mg by mouth two (2) times a day. 180 Tab 12    vitamin e (E GEMS) 1,000 unit capsule Take 1 Cap by mouth daily. One at bedtime for leg cramps.  30 Cap 12       Allergies   Allergen Reactions    Nuts [Tree Nut] Shortness of Breath     Mymar nuts cause asthma type symptoms       Review of Systems - History obtained from the patient  Constitutional: negative for weight loss, fever, +hot flashes, night sweats, insomnia, leg cramps  HEENT: negative for hearing loss, earache, congestion, snoring, sorethroat  CV: negative for chest pain, palpitations, edema  Resp: negative for cough, shortness of breath, wheezing  GI: negative for change in bowel habits, abdominal pain, black or bloody stools  : negative for frequency, dysuria, hematuria, vaginal discharge  MSK: negative for back pain, joint pain, muscle pain  Breast: negative for breast lumps, nipple discharge, galactorrhea  Skin :negative for itching, rash, hives  Neuro: negative for dizziness, headache, confusion, weakness  Psych: negative for anxiety, depression, change in mood  Heme/lymph: negative for bleeding, bruising, pallor    Physical Exam    Visit Vitals  /78   Ht 5' 11\" (1.803 m)   Wt 255 lb (115.7 kg)   LMP 01/13/2012   BMI 35.57 kg/m²       Constitutional  · Appearance: well-nourished, well developed, alert, in no acute distress    HENT  · Head and Face: appears normal    Neck  · Inspection/Palpation: normal appearance, no masses or tenderness  · Lymph Nodes: no lymphadenopathy present  · Thyroid: gland size normal, nontender, no nodules or masses present on palpation    Chest  · Respiratory Effort: non-labored breathing  · Auscultation: CTAB, normal breath sounds    Cardiovascular  · Heart:  · Auscultation: regular rate and rhythm without murmur  · Extremities: no peripheral edema    Breasts  · Inspection of Breasts: breasts symmetrical, no skin changes, no discharge present, nipple appearance normal, no skin retraction present  · Palpation of Breasts and Axillae: no masses present on palpation, no breast tenderness  · Axillary Lymph Nodes: no lymphadenopathy present    Gastrointestinal  · Abdominal Examination: abdomen non-tender to palpation, normal bowel sounds, no masses present  · Liver and spleen: no hepatomegaly present, spleen not palpable  · Hernias: no hernias identified    Genitourinary  · External Genitalia: normal appearance for age, no discharge present, no tenderness present, no inflammatory lesions present, no masses present, +atrophy of UG mucosa  · Vagina: normal vaginal vault without central or paravaginal defects, no discharge present, no inflammatory lesions present, no masses present  · Bladder: non-tender to palpation  · Urethra: appears normal  · Cervix: normal   · Uterus: normal size, shape and consistency, small, mobile, exam limited by obesity  · Adnexa: no adnexal tenderness present, no adnexal masses present  · Perineum: perineum within normal limits, no evidence of trauma, no rashes or skin lesions present    Skin  · General Inspection: no rash, no lesions identified    Neurologic/Psychiatric  · Mental Status:  · Orientation: grossly oriented to person, place and time  · Mood and Affect: mood normal, affect appropriate      Assessment/Plan:  63 y.o. postmenopausal female presenting for annual exam. Overall doing well.      Health Maintenance:  -diet, exercise, healthy lifestyle  -pap/HPV next due 2022  -declines STI testing   -repeat mammo 1 year  -colonoscopy utd  -trial of low-dose paxil for management of VMS  -discussed good sleep hygiene and discussed CBT   -recommended hydration, magnesium supplementation, stretching, etc for leg cramps, and advised f/u with PCP    RTC: 1 year for AE or sooner najma Weinberg MD  3/29/2021  1:41 PM

## 2021-03-29 NOTE — PATIENT INSTRUCTIONS
Pelvic Exam: Care Instructions  Your Care Instructions     When your doctor examines all of your pelvic organs, it's called a pelvic exam. Two good reasons to have this kind of exam are to check for sexually transmitted infections (STIs) and to get a Pap test. A Pap test is also called a Pap smear. It checks for early changes that can lead to cancer of the cervix. Sometimes a pelvic exam is part of a regular checkup. Your doctor may ask you to avoid vaginal sex, tampons, vaginal medicines, vaginal sprays or powders, and douching for 1 to 2 days before the test.  Other times, women have this kind of exam at any time of the month. This is because they have pelvic pain, bleeding, or discharge. Or they may have another pelvic problem. Before your exam, it's important to share some information with your doctor. For example, if you are a survivor of rape or sexual abuse, you can talk about any concerns you may have. Your doctor will also want to know if you are pregnant or use birth control. And he or she will want to hear about any problems, surgeries, or procedures you have had in your pelvic area. You will also need to tell your doctor when your last period was. Follow-up care is a key part of your treatment and safety. Be sure to make and go to all appointments, and call your doctor if you are having problems. It's also a good idea to know your test results and keep a list of the medicines you take. How is a pelvic exam done? · During a pelvic exam, you will:  ? Take off your clothes below the waist. You will get a paper or cloth cover to put over the lower half of your body. If this is regular checkup, you may undress completely and put on a gown. ? Lie on your back on an exam table. Your feet will be raised above you. Stirrups will support your feet. · The doctor will:  ? Ask you to relax your knees. Your knees need to lean out, toward the walls. ?  Check the opening of your vagina for sores or swelling. ? Gently put a tool called a speculum into your vagina. It opens the vagina a little bit. You will feel some pressure. But if you are relaxed, it will not hurt. It lets your doctor see inside the vagina. ? Use a small brush, spatula, or swab to get a sample of cells, if you are having a Pap test or culture. The doctor then removes the speculum. ? Put on gloves and put one or two fingers of one hand into your vagina. The other hand goes on your lower belly. This lets your doctor feel your pelvic organs. You will probably feel some pressure. Try to stay relaxed. ? Put one gloved finger into your rectum and one into your vagina, if needed. This can also help check your pelvic organs. This exam takes about 10 minutes. At the end, you will get a washcloth or tissue to clean your vaginal area. You can then get dressed. Why is a pelvic exam done? A pelvic exam may be done:  · As part of a woman's regular physical checkup. The exam may include a Pap test.  · To check for vaginal infection. · To check for sexually transmitted infections, such as chlamydia or herpes. · To help find the cause of abnormal uterine bleeding. · To look for problems like uterine fibroids, ovarian cysts, or uterine prolapse. · To find the cause of pelvic or belly pain. · Before inserting an intrauterine device (IUD) for birth control. · To collect evidence if you've been sexually assaulted. What are the risks of a pelvic exam?  There is a small chance that the doctor will find something on a pelvic exam that would not have caused a problem. This is called overdiagnosis. It could lead to tests or treatment you don't need. When should you call for help? Watch closely for changes in your health, and be sure to contact your doctor if you have any problems. Where can you learn more? Go to http://www.gray.com/  Enter M421 in the search box to learn more about \"Pelvic Exam: Care Instructions. \"  Current as of: November 8, 2019               Content Version: 12.6  © 5405-3208 [employer]. Care instructions adapted under license by [employer] (which disclaims liability or warranty for this information). If you have questions about a medical condition or this instruction, always ask your healthcare professional. [address] any warranty or liability for your use of this information.

## 2021-04-03 ENCOUNTER — IMMUNIZATION (OUTPATIENT)
Dept: INTERNAL MEDICINE CLINIC | Age: 61
End: 2021-04-03
Payer: COMMERCIAL

## 2021-04-03 DIAGNOSIS — Z23 ENCOUNTER FOR IMMUNIZATION: Primary | ICD-10-CM

## 2021-04-03 PROCEDURE — 91300 COVID-19, MRNA, LNP-S, PF, 30MCG/0.3ML DOSE(PFIZER): CPT | Performed by: FAMILY MEDICINE

## 2021-04-03 PROCEDURE — 0001A COVID-19, MRNA, LNP-S, PF, 30MCG/0.3ML DOSE(PFIZER): CPT | Performed by: FAMILY MEDICINE

## 2021-04-24 ENCOUNTER — IMMUNIZATION (OUTPATIENT)
Dept: INTERNAL MEDICINE CLINIC | Age: 61
End: 2021-04-24
Payer: COMMERCIAL

## 2021-04-24 DIAGNOSIS — Z23 ENCOUNTER FOR IMMUNIZATION: Primary | ICD-10-CM

## 2021-04-24 PROCEDURE — 0002A COVID-19, MRNA, LNP-S, PF, 30MCG/0.3ML DOSE(PFIZER): CPT | Performed by: FAMILY MEDICINE

## 2021-04-24 PROCEDURE — 91300 COVID-19, MRNA, LNP-S, PF, 30MCG/0.3ML DOSE(PFIZER): CPT | Performed by: FAMILY MEDICINE

## 2021-05-04 NOTE — TELEPHONE ENCOUNTER
----- Message from Reyes Lincoln sent at 9/16/2020  9:18 AM EDT -----  Regarding: Dr. Florencia Willis / Adelaide Appiah first and last name: self  Reason for call: Pt would like to get a flu shot at her appt today, 9/16/20 @ 2:40pm  Callback required yes/no and why: No  Best contact number(s): [phone]  Details to clarify the request: n/a
Voice mail full could not leave a message concerning her flu shot,  she can get her flu shot today at visit.
Add High Risk Medication Management Associated Diagnosis?: No
Patient Reported Weight(Optional But Include Units): 197
Detail Level: Generalized
Length Of Therapy: 1 year

## 2021-06-25 RX ORDER — LEVOTHYROXINE SODIUM 100 UG/1
TABLET ORAL
Qty: 90 TABLET | Refills: 2 | Status: SHIPPED | OUTPATIENT
Start: 2021-06-25 | End: 2021-06-28 | Stop reason: SDUPTHER

## 2021-06-28 ENCOUNTER — OFFICE VISIT (OUTPATIENT)
Dept: FAMILY MEDICINE CLINIC | Age: 61
End: 2021-06-28
Payer: COMMERCIAL

## 2021-06-28 VITALS
RESPIRATION RATE: 16 BRPM | BODY MASS INDEX: 35.84 KG/M2 | SYSTOLIC BLOOD PRESSURE: 149 MMHG | DIASTOLIC BLOOD PRESSURE: 81 MMHG | HEIGHT: 71 IN | OXYGEN SATURATION: 98 % | WEIGHT: 256 LBS | HEART RATE: 82 BPM | TEMPERATURE: 98.7 F

## 2021-06-28 DIAGNOSIS — Z23 ENCOUNTER FOR IMMUNIZATION: ICD-10-CM

## 2021-06-28 DIAGNOSIS — F51.01 PRIMARY INSOMNIA: ICD-10-CM

## 2021-06-28 DIAGNOSIS — E03.9 ACQUIRED HYPOTHYROIDISM: ICD-10-CM

## 2021-06-28 DIAGNOSIS — E66.9 OBESITY, UNSPECIFIED CLASSIFICATION, UNSPECIFIED OBESITY TYPE, UNSPECIFIED WHETHER SERIOUS COMORBIDITY PRESENT: ICD-10-CM

## 2021-06-28 DIAGNOSIS — R73.03 PREDIABETES: ICD-10-CM

## 2021-06-28 DIAGNOSIS — R25.2 MUSCLE CRAMPS: Primary | ICD-10-CM

## 2021-06-28 LAB
ALBUMIN SERPL-MCNC: 4 G/DL (ref 3.5–5)
ALBUMIN/GLOB SERPL: 1.1 {RATIO} (ref 1.1–2.2)
ALP SERPL-CCNC: 156 U/L (ref 45–117)
ALT SERPL-CCNC: 33 U/L (ref 12–78)
ANION GAP SERPL CALC-SCNC: 5 MMOL/L (ref 5–15)
AST SERPL-CCNC: 19 U/L (ref 15–37)
BASOPHILS # BLD: 0 K/UL (ref 0–0.1)
BASOPHILS NFR BLD: 1 % (ref 0–1)
BILIRUB SERPL-MCNC: 0.3 MG/DL (ref 0.2–1)
BUN SERPL-MCNC: 15 MG/DL (ref 6–20)
BUN/CREAT SERPL: 15 (ref 12–20)
CALCIUM SERPL-MCNC: 9.2 MG/DL (ref 8.5–10.1)
CHLORIDE SERPL-SCNC: 104 MMOL/L (ref 97–108)
CHOLEST SERPL-MCNC: 232 MG/DL
CO2 SERPL-SCNC: 32 MMOL/L (ref 21–32)
CREAT SERPL-MCNC: 1.03 MG/DL (ref 0.55–1.02)
DIFFERENTIAL METHOD BLD: ABNORMAL
EOSINOPHIL # BLD: 0.1 K/UL (ref 0–0.4)
EOSINOPHIL NFR BLD: 2 % (ref 0–7)
ERYTHROCYTE [DISTWIDTH] IN BLOOD BY AUTOMATED COUNT: 13.2 % (ref 11.5–14.5)
GLOBULIN SER CALC-MCNC: 3.8 G/DL (ref 2–4)
GLUCOSE SERPL-MCNC: 92 MG/DL (ref 65–100)
HBA1C MFR BLD HPLC: 6 %
HCT VFR BLD AUTO: 42.8 % (ref 35–47)
HDLC SERPL-MCNC: 68 MG/DL
HDLC SERPL: 3.4 {RATIO} (ref 0–5)
HGB BLD-MCNC: 13.7 G/DL (ref 11.5–16)
IMM GRANULOCYTES # BLD AUTO: 0 K/UL (ref 0–0.04)
IMM GRANULOCYTES NFR BLD AUTO: 1 % (ref 0–0.5)
LDLC SERPL CALC-MCNC: 120.6 MG/DL (ref 0–100)
LYMPHOCYTES # BLD: 1.5 K/UL (ref 0.8–3.5)
LYMPHOCYTES NFR BLD: 29 % (ref 12–49)
MAGNESIUM SERPL-MCNC: 2.5 MG/DL (ref 1.6–2.4)
MCH RBC QN AUTO: 28.4 PG (ref 26–34)
MCHC RBC AUTO-ENTMCNC: 32 G/DL (ref 30–36.5)
MCV RBC AUTO: 88.6 FL (ref 80–99)
MONOCYTES # BLD: 0.4 K/UL (ref 0–1)
MONOCYTES NFR BLD: 8 % (ref 5–13)
NEUTS SEG # BLD: 3.2 K/UL (ref 1.8–8)
NEUTS SEG NFR BLD: 59 % (ref 32–75)
NRBC # BLD: 0 K/UL (ref 0–0.01)
NRBC BLD-RTO: 0 PER 100 WBC
PLATELET # BLD AUTO: 304 K/UL (ref 150–400)
PMV BLD AUTO: 8.8 FL (ref 8.9–12.9)
POTASSIUM SERPL-SCNC: 4.4 MMOL/L (ref 3.5–5.1)
PROT SERPL-MCNC: 7.8 G/DL (ref 6.4–8.2)
RBC # BLD AUTO: 4.83 M/UL (ref 3.8–5.2)
SODIUM SERPL-SCNC: 141 MMOL/L (ref 136–145)
T4 FREE SERPL-MCNC: 1.1 NG/DL (ref 0.8–1.5)
TRIGL SERPL-MCNC: 217 MG/DL (ref ?–150)
TSH SERPL DL<=0.05 MIU/L-ACNC: 1.22 UIU/ML (ref 0.36–3.74)
VLDLC SERPL CALC-MCNC: 43.4 MG/DL
WBC # BLD AUTO: 5.3 K/UL (ref 3.6–11)

## 2021-06-28 PROCEDURE — 90471 IMMUNIZATION ADMIN: CPT | Performed by: FAMILY MEDICINE

## 2021-06-28 PROCEDURE — 99214 OFFICE O/P EST MOD 30 MIN: CPT | Performed by: FAMILY MEDICINE

## 2021-06-28 PROCEDURE — 90750 HZV VACC RECOMBINANT IM: CPT | Performed by: FAMILY MEDICINE

## 2021-06-28 PROCEDURE — 83036 HEMOGLOBIN GLYCOSYLATED A1C: CPT | Performed by: FAMILY MEDICINE

## 2021-06-28 RX ORDER — TOPIRAMATE 50 MG/1
TABLET, FILM COATED ORAL
Qty: 60 TABLET | Refills: 5 | Status: SHIPPED | OUTPATIENT
Start: 2021-06-28 | End: 2022-05-31 | Stop reason: ALTCHOICE

## 2021-06-28 RX ORDER — CICLOPIROX OLAMINE 7.7 MG/G
CREAM TOPICAL
COMMUNITY
Start: 2021-04-05 | End: 2022-05-31 | Stop reason: ALTCHOICE

## 2021-06-28 RX ORDER — QUININE SULFATE 324 MG/1
CAPSULE ORAL
Qty: 30 CAPSULE | Refills: 5 | Status: SHIPPED | OUTPATIENT
Start: 2021-06-28 | End: 2022-05-31 | Stop reason: ALTCHOICE

## 2021-06-28 RX ORDER — LEVOTHYROXINE SODIUM 100 UG/1
TABLET ORAL
Qty: 90 TABLET | Refills: 3 | Status: SHIPPED | OUTPATIENT
Start: 2021-06-28 | End: 2022-07-03

## 2021-06-28 RX ORDER — ZOLPIDEM TARTRATE 10 MG/1
TABLET ORAL
Qty: 30 TABLET | Refills: 5 | Status: SHIPPED | OUTPATIENT
Start: 2021-06-28 | End: 2022-05-31 | Stop reason: SDUPTHER

## 2021-06-28 RX ORDER — ALBUTEROL SULFATE 90 UG/1
AEROSOL, METERED RESPIRATORY (INHALATION)
Qty: 3 INHALER | Refills: 12 | Status: SHIPPED | OUTPATIENT
Start: 2021-06-28 | End: 2022-07-03

## 2021-06-28 NOTE — PROGRESS NOTES
Chief Complaint   Patient presents with    Thyroid Problem    Follow-up     1. Have you been to the ER, urgent care clinic since your last visit? Hospitalized since your last visit? No    2. Have you seen or consulted any other health care providers outside of the [address] [place] [address] since your last visit? Include any pap smears or colon screening. Yes Ortho - Dr. Karen Hernandez received from Dr. Robert Powell for Shingrix #1 given IM  in right arm.

## 2021-06-28 NOTE — PROGRESS NOTES
HISTORY OF PRESENT ILLNESS  Michael Banegas is a 63 y.o. female. HPI In for checkup. Still trying to lose weight. Took contrave- caused severe headaches. Also getting severe leg cramps. Has been helped at times by quinine. Has been taking magnesium- hasnt helped that much. Drinks lots of water everyday. Still working for [employer]. Past Medical History:   Diagnosis Date    Anxiety and depression     Asthma     Hyperthyroidism     s/p 32.1 mCi on 6/8/12 for toxic multinodular goiter    Other postablative hypothyroidism 11/6/2012       Social History     Socioeconomic History    Marital status: SINGLE     Spouse name: Not on file    Number of children: Not on file    Years of education: Not on file    Highest education level: Not on file   Tobacco Use    Smoking status: Never Smoker    Smokeless tobacco: Never Used   Substance and Sexual Activity    Alcohol use: No    Drug use: No    Sexual activity: Not Currently   Social History Xavier    Lives in [address] [place] [address],[address] alone but daughter stays frequently. Also has 1 son. Works for [employer] in [place]. Has a 3year old grandson that lives with her, and pts daughter, who is the mother                 Social Determinants of Health     Financial Resource Strain:     Difficulty of Paying Living Expenses:    Food Insecurity:     Worried About Running Out of Food in the Last Year:     [address] in the Last Year:    Transportation Needs:     Lack of Transportation (Medical):      Lack of Transportation (Non-Medical):    Physical Activity:     Days of Exercise per Week:     Minutes of Exercise per Session:    Stress:     Feeling of Stress :    Social Connections:     Frequency of Communication with Friends and Family:     Frequency of Social Gatherings with Friends and Family:     Attends [employer] Services:     Active Member of Clubs or Organizations:     Attends Club or Organization Meetings:     Marital Status: Current Outpatient Medications   Medication Sig Dispense Refill    ciclopirox (LOPROX) 0.77 % topical cream APPLY TO AFFECTED AREA DAILY      levothyroxine (SYNTHROID) 100 mcg tablet TAKE ONE TAB BY MOUTH DAILY ON EMPTY STOMACH 90 Tablet 3    ProAir HFA 90 mcg/actuation inhaler USE 2 INHALATIONS EVERY 4 HOURS AS NEEDED FOR WHEEZING 3 Inhaler 12    quiNINE 324 mg capsule TAKE 1 CAP BY MOUTH NIGHTLY AS NEEDED (MUSCLE CRAMPS) FOR UP TO 30 DAYS 30 Capsule 5    zolpidem (AMBIEN) 10 mg tablet TAKE 1 TABLET BY MOUTH EVERY NIGHT AS NEEDED FOR SLEEP 30 Tablet 5    topiramate (TOPAMAX) 50 mg tablet One tab po qhs for 2 weeks, then one po bid. For weight loss 60 Tablet 5    meloxicam (MOBIC) 15 mg tablet TAKE 1/2   1 TABLET BY MOUTH DAILY WITH FOOD FOR INFLAMMATION      vitamin e (E GEMS) 1,000 unit capsule Take 1 Cap by mouth daily. One at bedtime for leg cramps. 30 Cap 12         ROS    Physical Exam  Vitals and nursing note reviewed. Constitutional:       Appearance: She is well-developed. HENT:      Right Ear: External ear normal.      Left Ear: External ear normal.   Neck:      Thyroid: No thyromegaly. Cardiovascular:      Rate and Rhythm: Normal rate and regular rhythm. Heart sounds: Normal heart sounds. Pulmonary:      Breath sounds: Normal breath sounds. No wheezing. Abdominal:      General: Bowel sounds are normal. There is no distension. Palpations: Abdomen is soft. There is no mass. Tenderness: There is no abdominal tenderness. Lymphadenopathy:      Cervical: No cervical adenopathy.          ASSESSMENT and PLAN  Orders Placed This Encounter    Zoster VACC Recominant Adjuvanted (Shingrix)    MAGNESIUM    CBC WITH AUTOMATED DIFF    METABOLIC PANEL, COMPREHENSIVE    LIPID PANEL    T4, FREE    TSH 3RD GENERATION    AMB POC HEMOGLOBIN A1C    NC IMMUNIZ ADMIN,1 SINGLE/COMB VAC/TOXOID    levothyroxine (SYNTHROID) 100 mcg tablet    ProAir HFA 90 mcg/actuation inhaler    quiNINE 324 mg capsule    zolpidem (AMBIEN) 10 mg tablet    topiramate (TOPAMAX) 50 mg tablet     Diagnoses and all orders for this visit:    1. Muscle cramps  -     MAGNESIUM; Future    2. Primary insomnia  -     zolpidem (AMBIEN) 10 mg tablet; TAKE 1 TABLET BY MOUTH EVERY NIGHT AS NEEDED FOR SLEEP  -     T4, FREE; Future  -     TSH 3RD GENERATION; Future    3. Acquired hypothyroidism    4. Obesity, unspecified classification, unspecified obesity type, unspecified whether serious comorbidity present  -     CBC WITH AUTOMATED DIFF; Future  -     METABOLIC PANEL, COMPREHENSIVE; Future  -     LIPID PANEL; Future    5. Prediabetes  -     AMB POC HEMOGLOBIN A1C    6. Encounter for immunization  -     ID IMMUNIZ ADMIN,1 SINGLE/COMB VAC/TOXOID  -     ZOSTER VACC RECOMBINANT ADJUVANTED    Other orders  -     levothyroxine (SYNTHROID) 100 mcg tablet; TAKE ONE TAB BY MOUTH DAILY ON EMPTY STOMACH  -     ProAir HFA 90 mcg/actuation inhaler; USE 2 INHALATIONS EVERY 4 HOURS AS NEEDED FOR WHEEZING  -     quiNINE 324 mg capsule; TAKE 1 CAP BY MOUTH NIGHTLY AS NEEDED (MUSCLE CRAMPS) FOR UP TO 30 DAYS  -     topiramate (TOPAMAX) 50 mg tablet; One tab po qhs for 2 weeks, then one po bid.  For weight loss

## 2021-07-07 ENCOUNTER — TELEPHONE (OUTPATIENT)
Dept: FAMILY MEDICINE CLINIC | Age: 61
End: 2021-07-07

## 2021-10-15 ENCOUNTER — OFFICE VISIT (OUTPATIENT)
Dept: FAMILY MEDICINE CLINIC | Age: 61
End: 2021-10-15
Payer: COMMERCIAL

## 2021-10-15 DIAGNOSIS — Z23 ENCOUNTER FOR IMMUNIZATION: Primary | ICD-10-CM

## 2021-10-15 PROCEDURE — 90471 IMMUNIZATION ADMIN: CPT | Performed by: FAMILY MEDICINE

## 2021-10-15 PROCEDURE — 90750 HZV VACC RECOMBINANT IM: CPT | Performed by: FAMILY MEDICINE

## 2021-11-26 ENCOUNTER — TRANSCRIBE ORDER (OUTPATIENT)
Dept: SCHEDULING | Age: 61
End: 2021-11-26

## 2021-11-26 DIAGNOSIS — Z12.31 SCREENING MAMMOGRAM FOR HIGH-RISK PATIENT: Primary | ICD-10-CM

## 2021-11-29 ENCOUNTER — OFFICE VISIT (OUTPATIENT)
Dept: FAMILY MEDICINE CLINIC | Age: 61
End: 2021-11-29
Payer: COMMERCIAL

## 2021-11-29 DIAGNOSIS — G47.62 NOCTURNAL LEG CRAMPS: Primary | ICD-10-CM

## 2021-11-29 DIAGNOSIS — Z23 ENCOUNTER FOR ADMINISTRATION OF COVID-19 VACCINE: ICD-10-CM

## 2021-11-29 DIAGNOSIS — Z23 NEEDS FLU SHOT: ICD-10-CM

## 2021-11-29 DIAGNOSIS — Z23 ENCOUNTER FOR IMMUNIZATION: ICD-10-CM

## 2021-11-29 PROCEDURE — 91300 COVID-19, MRNA, LNP-S, PF, 30MCG/0.3ML DOSE(PFIZER): CPT | Performed by: FAMILY MEDICINE

## 2021-11-29 PROCEDURE — 99213 OFFICE O/P EST LOW 20 MIN: CPT | Performed by: FAMILY MEDICINE

## 2021-11-29 PROCEDURE — 0003A COVID-19, MRNA, LNP-S, PF, 30MCG/0.3ML DOSE(PFIZER): CPT | Performed by: FAMILY MEDICINE

## 2021-11-29 PROCEDURE — 90471 IMMUNIZATION ADMIN: CPT | Performed by: FAMILY MEDICINE

## 2021-11-29 PROCEDURE — 90686 IIV4 VACC NO PRSV 0.5 ML IM: CPT | Performed by: FAMILY MEDICINE

## 2021-11-29 RX ORDER — GABAPENTIN 100 MG/1
CAPSULE ORAL
Qty: 60 CAPSULE | Refills: 1 | Status: SHIPPED | OUTPATIENT
Start: 2021-11-29 | End: 2022-05-31 | Stop reason: ALTCHOICE

## 2021-11-29 NOTE — PROGRESS NOTES
HISTORY OF PRESENT ILLNESS  Zuleima Graham is a 63 y.o. female. HPI gets leg cramps in feet, calves, thighs. Cramps usually come on at night or when takes a nap. Started drinking pickle juisce- not sure if this helped or not. Has also been on Quinam- no relief. Exercise classes- didn't help vitamin E and Benadryl- no relief. Drinks 6-8 bottles a day. ROS    Physical Exam  Vitals and nursing note reviewed. Constitutional:       Appearance: She is well-developed. HENT:      Right Ear: External ear normal.      Left Ear: External ear normal.   Neck:      Thyroid: No thyromegaly. Cardiovascular:      Rate and Rhythm: Normal rate and regular rhythm. Heart sounds: Normal heart sounds. Pulmonary:      Breath sounds: Normal breath sounds. No wheezing. Abdominal:      General: Bowel sounds are normal. There is no distension. Palpations: Abdomen is soft. There is no mass. Tenderness: There is no abdominal tenderness. Lymphadenopathy:      Cervical: No cervical adenopathy. ASSESSMENT and PLAN  Orders Placed This Encounter    Influenza Vaccine. QUAD, PF, SYR (Afluria 93628)    COVID-19, PFIZER, MRNA, LNP-S, PF, 30MCG/0.3ML DOSE(80483, 0001A, 0002A)    WA IMMUNIZ ADMIN,1 SINGLE/COMB VAC/TOXOID    gabapentin (NEURONTIN) 100 mg capsule     Diagnoses and all orders for this visit:    1. Nocturnal leg cramps  -     gabapentin (NEURONTIN) 100 mg capsule; 1-3 tabs po qhs prn leg cramps    2. Encounter for immunization  -     WA IMMUNIZ ADMIN,1 SINGLE/COMB VAC/TOXOID  -     INFLUENZA VIRUS VAC QUAD,SPLIT,PRESV FREE SYRINGE IM    3. Needs flu shot  -     WA IMMUNIZ ADMIN,1 SINGLE/COMB VAC/TOXOID  -     INFLUENZA VIRUS VAC QUAD,SPLIT,PRESV FREE SYRINGE IM    4. Encounter for administration of COVID-19 vaccine  -     COVID-19, MRNA, LNP-S, PF, 30MCG/0.3ML DOSE(PFIZER)          Pt will also try calcium lactate 1000 mg bid and to do stretching exercises.

## 2021-11-29 NOTE — PATIENT INSTRUCTIONS
Vaccine Information Statement    Influenza (Flu) Vaccine (Inactivated or Recombinant): What You Need to Know    Many vaccine information statements are available in [place] and other languages. See www.immunize.org/vis. Hojas de información sobre vacunas están disponibles en español y en muchos otros idiomas. Visite www.immunize.org/vis. 1. Why get vaccinated? Influenza vaccine can prevent influenza (flu). Flu is a contagious disease that spreads around the [facility] every year, usually between October and May. Anyone can get the flu, but it is more dangerous for some people. Infants and young children, people 72 years and older, pregnant people, and people with certain health conditions or a weakened immune system are at greatest risk of flu complications. Pneumonia, bronchitis, sinus infections, and ear infections are examples of flu-related complications. If you have a medical condition, such as heart disease, cancer, or diabetes, flu can make it worse. Flu can cause fever and chills, sore throat, muscle aches, fatigue, cough, headache, and runny or stuffy nose. Some people may have vomiting and diarrhea, though this is more common in children than adults. In an average year, thousands of people in the [facility] die from flu, and many more are hospitalized. Flu vaccine prevents millions of illnesses and flu-related visits to the doctor each year. 2. Influenza vaccines     CDC recommends everyone 6 months and older get vaccinated every flu season. Children 6 months through 6years of age may need 2 doses during a single flu season. Everyone else needs only 1 dose each flu season. It takes about 2 weeks for protection to develop after vaccination. There are many flu viruses, and they are always changing. Each year a new flu vaccine is made to protect against the influenza viruses believed to be likely to cause disease in the upcoming flu season.  Even when the vaccine doesnt exactly match these viruses, it may still provide some protection. Influenza vaccine does not cause flu. Influenza vaccine may be given at the same time as other vaccines. 3. Talk with your health care provider    Tell your vaccination provider if the person getting the vaccine:   Has had an allergic reaction after a previous dose of influenza vaccine, or has any severe, life-threatening allergies    Has ever had Guillain-Barré Syndrome (also called GBS)    In some cases, your health care provider may decide to postpone influenza vaccination until a future visit. Influenza vaccine can be administered at any time during pregnancy. People who are or will be pregnant during influenza season should receive inactivated influenza vaccine. People with minor illnesses, such as a cold, may be vaccinated. People who are moderately or severely ill should usually wait until they recover before getting influenza vaccine. Your health care provider can give you more information. 4. Risks of a vaccine reaction     Soreness, redness, and swelling where the shot is given, fever, muscle aches, and headache can happen after influenza vaccination.  There may be a very small increased risk of Guillain-Barré Syndrome (GBS) after inactivated influenza vaccine (the flu shot). Luzmaria Peck children who get the flu shot along with pneumococcal vaccine (PCV13) and/or DTaP vaccine at the same time might be slightly more likely to have a seizure caused by fever. Tell your health care provider if a child who is getting flu vaccine has ever had a seizure. People sometimes faint after medical procedures, including vaccination. Tell your provider if you feel dizzy or have vision changes or ringing in the ears. As with any medicine, there is a very remote chance of a vaccine causing a severe allergic reaction, other serious injury, or death. 5. What if there is a serious problem?     An allergic reaction could occur after the vaccinated person leaves the clinic. If you see signs of a severe allergic reaction (hives, swelling of the face and throat, difficulty breathing, a fast heartbeat, dizziness, or weakness), call 9-1-1 and get the person to the nearest hospital.    For other signs that concern you, call your health care provider. Adverse reactions should be reported to the Vaccine Adverse Event Reporting System (VAERS). Your health care provider will usually file this report, or you can do it yourself. Visit the VAERS website at www.vaers. [facility].gov or call 0-177.145.4088. VAERS is only for reporting reactions, and VAERS staff members do not give medical advice. 6. The National Vaccine Injury Compensation Program    The [facility] Vaccine Injury Compensation Program (VICP) is a federal program that was created to compensate people who may have been injured by certain vaccines. Claims regarding alleged injury or death due to vaccination have a time limit for filing, which may be as short as two years. Visit the VICP website at www.[facility].gov/vaccinecompensation or call 6-261.240.5863 to learn about the program and about filing a claim. 7. How can I learn more?  Ask your health care provider.  Call your local or state health department.  Visit the website of the Food and Drug Administration (FDA) for vaccine package inserts and additional information at www.fda.gov/vaccines-blood-biologics/vaccines.  Contact the Centers for Disease Control and Prevention (CDC):  - Call 5-941.633.1187 (1-800-CDC-INFO) or  - Visit CDCs influenza website at www.cdc.gov/flu. Vaccine Information Statement   Inactivated Influenza Vaccine   8/6/2021  42 JACOB Boland 618QP-27   Department of Health and Human Services  Centers for Disease Control and Prevention    Office Use Only

## 2021-11-29 NOTE — PROGRESS NOTES
Chief Complaint   Patient presents with    Thyroid Problem    Follow-up     1. Have you been to the ER, urgent care clinic since your last visit? Hospitalized since your last visit? No    2. Have you seen or consulted any other health care providers outside of the [address] [place] [address] since your last visit? Include any pap smears or colon screening. No     Verbal Order received from Dr. Glenda Potts   for influenza (left) and [employer] given IM  in arm.

## 2022-02-28 ENCOUNTER — HOSPITAL ENCOUNTER (OUTPATIENT)
Dept: MAMMOGRAPHY | Age: 62
Discharge: HOME | End: 2022-02-28
Payer: COMMERCIAL

## 2022-02-28 DIAGNOSIS — Z12.31 SCREENING MAMMOGRAM FOR HIGH-RISK PATIENT: ICD-10-CM

## 2022-02-28 PROCEDURE — 77063 BREAST TOMOSYNTHESIS BI: CPT

## 2022-03-02 ENCOUNTER — TELEPHONE (OUTPATIENT)
Dept: FAMILY MEDICINE CLINIC | Age: 62
End: 2022-03-02

## 2022-03-03 NOTE — TELEPHONE ENCOUNTER
Number for home number no answer. No voice mail. Attempted to reach patient by mobile number and left message on patient mobile number regarding message. Need clarification on message.

## 2022-03-19 PROBLEM — R23.2 HOT FLASHES: Status: ACTIVE | Noted: 2017-12-12

## 2022-03-19 PROBLEM — F51.01 PRIMARY INSOMNIA: Status: ACTIVE | Noted: 2017-12-12

## 2022-03-19 PROBLEM — E66.01 SEVERE OBESITY (HCC): Status: ACTIVE | Noted: 2020-09-16

## 2022-05-31 ENCOUNTER — OFFICE VISIT (OUTPATIENT)
Dept: FAMILY MEDICINE CLINIC | Age: 62
End: 2022-05-31
Payer: COMMERCIAL

## 2022-05-31 VITALS
DIASTOLIC BLOOD PRESSURE: 95 MMHG | BODY MASS INDEX: 35.67 KG/M2 | HEIGHT: 71 IN | HEART RATE: 88 BPM | WEIGHT: 254.8 LBS | SYSTOLIC BLOOD PRESSURE: 164 MMHG | OXYGEN SATURATION: 95 % | RESPIRATION RATE: 16 BRPM

## 2022-05-31 DIAGNOSIS — E03.9 ACQUIRED HYPOTHYROIDISM: ICD-10-CM

## 2022-05-31 DIAGNOSIS — F51.01 PRIMARY INSOMNIA: ICD-10-CM

## 2022-05-31 DIAGNOSIS — I10 ESSENTIAL HYPERTENSION: ICD-10-CM

## 2022-05-31 DIAGNOSIS — E78.00 HYPERCHOLESTEROLEMIA: ICD-10-CM

## 2022-05-31 DIAGNOSIS — R25.2 MUSCLE CRAMPS: Primary | ICD-10-CM

## 2022-05-31 DIAGNOSIS — Z23 ENCOUNTER FOR ADMINISTRATION OF COVID-19 VACCINE: ICD-10-CM

## 2022-05-31 PROCEDURE — 99214 OFFICE O/P EST MOD 30 MIN: CPT | Performed by: FAMILY MEDICINE

## 2022-05-31 PROCEDURE — 91305 COVID-19, PFIZER GRAY TOP, DO NOT DILUTE, TRIS-SUCROSE, 12+ YRS, PF, 30MCG/0.3 ML DOSE: CPT | Performed by: FAMILY MEDICINE

## 2022-05-31 PROCEDURE — 0054A COVID-19, PFIZER GRAY TOP, DO NOT DILUTE, TRIS-SUCROSE, 12+ YRS, PF, 30MCG/0.3 ML DOSE: CPT | Performed by: FAMILY MEDICINE

## 2022-05-31 RX ORDER — TIZANIDINE 4 MG/1
TABLET ORAL
Qty: 30 TABLET | Refills: 5 | Status: SHIPPED | OUTPATIENT
Start: 2022-05-31

## 2022-05-31 RX ORDER — ZOLPIDEM TARTRATE 10 MG/1
TABLET ORAL
Qty: 30 TABLET | Refills: 5 | Status: SHIPPED | OUTPATIENT
Start: 2022-05-31

## 2022-05-31 RX ORDER — AMLODIPINE BESYLATE 5 MG/1
5 TABLET ORAL DAILY
Qty: 90 TABLET | Refills: 3 | Status: SHIPPED | OUTPATIENT
Start: 2022-05-31

## 2022-05-31 NOTE — PROGRESS NOTES
Chief Complaint   Patient presents with    Leg Pain     cramps in legs       1. \"Have you been to the ER, urgent care clinic since your last visit? Hospitalized since your last visit? \" No    2. \"Have you seen or consulted any other health care providers outside of the [address] [place] [address] since your last visit? \" Yes Ortho - both knees     3. For patients aged 39-70: Has the patient had a colonoscopy / FIT/ Cologuard? Yes - no Care Gap present      If the patient is female:    4. For patients aged 41-77: Has the patient had a mammogram within the past 2 years? Yes - no Care Gap present      5. For patients aged 21-65: Has the patient had a pap smear? Yes - no Care Gap present    There are no preventive care reminders to display for this patient. Verbal Order received from Dr. Melissa Yip  for Noelle [id number]  given IM  in left arm.

## 2022-05-31 NOTE — PROGRESS NOTES
HISTORY OF PRESENT ILLNESS  Scarlett Mcqueen is a 63 y.o. female. HPI Continues to have bad leg cramps bilaterally  quinine- didn't help. Worse cramps are in the thigh. Also needs fmla papers filled  Out. Past Medical History:   Diagnosis Date    Anxiety and depression     Asthma     Hyperthyroidism     s/p 32.1 mCi on 6/8/12 for toxic multinodular goiter    Other postablative hypothyroidism 11/6/2012       Social History     Socioeconomic History    Marital status: SINGLE   Tobacco Use    Smoking status: Never Smoker    Smokeless tobacco: Never Used   Substance and Sexual Activity    Alcohol use: No    Drug use: No    Sexual activity: Not Currently   Social History Narrative    Lives in [address] [place] [address] alone but daughter stays frequently. Also has 1 son. Works for [employer] in [place]. Has a 3year old grandson that lives with her, and pts daughter, who is the mother                   Current Outpatient Medications   Medication Sig Dispense Refill    zolpidem (AMBIEN) 10 mg tablet TAKE 1 TABLET BY MOUTH EVERY NIGHT AS NEEDED FOR SLEEP 30 Tablet 5    tiZANidine (ZANAFLEX) 4 mg tablet One po every 8 hours as needed for cramps 30 Tablet 5    amLODIPine (NORVASC) 5 mg tablet Take 1 Tablet by mouth daily. For blood pressure 90 Tablet 3    levothyroxine (SYNTHROID) 100 mcg tablet TAKE ONE TAB BY MOUTH DAILY ON EMPTY STOMACH 90 Tablet 3    ProAir HFA 90 mcg/actuation inhaler USE 2 INHALATIONS EVERY 4 HOURS AS NEEDED FOR WHEEZING 3 Inhaler 12    meloxicam (MOBIC) 15 mg tablet Take 15 mg by mouth daily as needed.  vitamin e (E GEMS) 1,000 unit capsule Take 1 Cap by mouth daily. One at bedtime for leg cramps. 30 Cap 12         ROS    Physical Exam  Vitals and nursing note reviewed. Constitutional:       Appearance: She is well-developed. HENT:      Right Ear: External ear normal.      Left Ear: External ear normal.   Neck:      Thyroid: No thyromegaly.    Cardiovascular: Rate and Rhythm: Normal rate and regular rhythm. Heart sounds: Normal heart sounds. Pulmonary:      Breath sounds: Normal breath sounds. No wheezing. Abdominal:      General: Bowel sounds are normal. There is no distension. Palpations: Abdomen is soft. There is no mass. Tenderness: There is no abdominal tenderness. Lymphadenopathy:      Cervical: No cervical adenopathy. ASSESSMENT and PLAN  Orders Placed This Encounter    COVID-19, Pfizer [employer], DO NOT Dilute, Amador-Sucrose, 12+ yrs, PF, 30mcg/0.3 mL dose    METABOLIC PANEL, COMPREHENSIVE    MAGNESIUM    TSH 3RD GENERATION    T4, FREE    CBC WITH AUTOMATED DIFF    LIPID PANEL    zolpidem (AMBIEN) 10 mg tablet    tiZANidine (ZANAFLEX) 4 mg tablet    amLODIPine (NORVASC) 5 mg tablet     Diagnoses and all orders for this visit:    1. Muscle cramps  -     METABOLIC PANEL, COMPREHENSIVE; Future  -     MAGNESIUM; Future    2. Encounter for administration of COVID-19 vaccine  -     COVID-19, PFIZER GRAY TOP, DO NOT DILUTE, AMADOR-SUCROSE, 12+ YRS, PF, 30MCG/0.3 ML DOSE    3. Primary insomnia  -     zolpidem (AMBIEN) 10 mg tablet; TAKE 1 TABLET BY MOUTH EVERY NIGHT AS NEEDED FOR SLEEP    4. Acquired hypothyroidism  -     TSH 3RD GENERATION; Future  -     T4, FREE; Future    5. Essential hypertension  -     CBC WITH AUTOMATED DIFF; Future    6. Hypercholesterolemia  -     LIPID PANEL; Future    Other orders  -     tiZANidine (ZANAFLEX) 4 mg tablet; One po every 8 hours as needed for cramps  -     amLODIPine (NORVASC) 5 mg tablet; Take 1 Tablet by mouth daily. For blood pressure      Follow-up and Dispositions    · Return in about 6 months (around 11/30/2022).

## 2022-06-01 LAB
ALBUMIN SERPL-MCNC: 3.8 G/DL (ref 3.5–5)
ALBUMIN/GLOB SERPL: 1.1 {RATIO} (ref 1.1–2.2)
ALP SERPL-CCNC: 117 U/L (ref 45–117)
ALT SERPL-CCNC: 24 U/L (ref 12–78)
ANION GAP SERPL CALC-SCNC: 3 MMOL/L (ref 5–15)
AST SERPL-CCNC: 15 U/L (ref 15–37)
BASOPHILS # BLD: 0 K/UL (ref 0–0.1)
BASOPHILS NFR BLD: 1 % (ref 0–1)
BILIRUB SERPL-MCNC: 0.4 MG/DL (ref 0.2–1)
BUN SERPL-MCNC: 22 MG/DL (ref 6–20)
BUN/CREAT SERPL: 26 (ref 12–20)
CALCIUM SERPL-MCNC: 9.8 MG/DL (ref 8.5–10.1)
CHLORIDE SERPL-SCNC: 104 MMOL/L (ref 97–108)
CHOLEST SERPL-MCNC: 217 MG/DL
CO2 SERPL-SCNC: 33 MMOL/L (ref 21–32)
CREAT SERPL-MCNC: 0.86 MG/DL (ref 0.55–1.02)
DIFFERENTIAL METHOD BLD: ABNORMAL
EOSINOPHIL # BLD: 0.1 K/UL (ref 0–0.4)
EOSINOPHIL NFR BLD: 1 % (ref 0–7)
ERYTHROCYTE [DISTWIDTH] IN BLOOD BY AUTOMATED COUNT: 13.3 % (ref 11.5–14.5)
GLOBULIN SER CALC-MCNC: 3.6 G/DL (ref 2–4)
GLUCOSE SERPL-MCNC: 100 MG/DL (ref 65–100)
HCT VFR BLD AUTO: 44.1 % (ref 35–47)
HDLC SERPL-MCNC: 61 MG/DL
HDLC SERPL: 3.6 {RATIO} (ref 0–5)
HGB BLD-MCNC: 13.8 G/DL (ref 11.5–16)
IMM GRANULOCYTES # BLD AUTO: 0 K/UL (ref 0–0.04)
IMM GRANULOCYTES NFR BLD AUTO: 0 % (ref 0–0.5)
LDLC SERPL CALC-MCNC: 97.8 MG/DL (ref 0–100)
LYMPHOCYTES # BLD: 1.8 K/UL (ref 0.8–3.5)
LYMPHOCYTES NFR BLD: 23 % (ref 12–49)
MAGNESIUM SERPL-MCNC: 2.5 MG/DL (ref 1.6–2.4)
MCH RBC QN AUTO: 29.2 PG (ref 26–34)
MCHC RBC AUTO-ENTMCNC: 31.3 G/DL (ref 30–36.5)
MCV RBC AUTO: 93.4 FL (ref 80–99)
MONOCYTES # BLD: 0.5 K/UL (ref 0–1)
MONOCYTES NFR BLD: 7 % (ref 5–13)
NEUTS SEG # BLD: 5.3 K/UL (ref 1.8–8)
NEUTS SEG NFR BLD: 68 % (ref 32–75)
NRBC # BLD: 0 K/UL (ref 0–0.01)
NRBC BLD-RTO: 0 PER 100 WBC
PLATELET # BLD AUTO: 323 K/UL (ref 150–400)
PMV BLD AUTO: 8.7 FL (ref 8.9–12.9)
POTASSIUM SERPL-SCNC: 3.8 MMOL/L (ref 3.5–5.1)
PROT SERPL-MCNC: 7.4 G/DL (ref 6.4–8.2)
RBC # BLD AUTO: 4.72 M/UL (ref 3.8–5.2)
SODIUM SERPL-SCNC: 140 MMOL/L (ref 136–145)
T4 FREE SERPL-MCNC: 1.2 NG/DL (ref 0.8–1.5)
TRIGL SERPL-MCNC: 291 MG/DL (ref ?–150)
TSH SERPL DL<=0.05 MIU/L-ACNC: 0.45 UIU/ML (ref 0.36–3.74)
VLDLC SERPL CALC-MCNC: 58.2 MG/DL
WBC # BLD AUTO: 7.8 K/UL (ref 3.6–11)

## 2022-06-06 RX ORDER — ATORVASTATIN CALCIUM 20 MG/1
20 TABLET, FILM COATED ORAL DAILY
Qty: 90 TABLET | Refills: 3 | Status: SHIPPED | OUTPATIENT
Start: 2022-06-06

## 2022-07-03 RX ORDER — ALBUTEROL SULFATE 90 UG/1
AEROSOL, METERED RESPIRATORY (INHALATION)
Qty: 25.5 EACH | Refills: 12 | Status: SHIPPED | OUTPATIENT
Start: 2022-07-03

## 2022-07-03 RX ORDER — LEVOTHYROXINE SODIUM 100 UG/1
TABLET ORAL
Qty: 90 TABLET | Refills: 3 | Status: SHIPPED | OUTPATIENT
Start: 2022-07-03

## 2022-11-15 ENCOUNTER — DOCUMENTATION ONLY (OUTPATIENT)
Dept: FAMILY MEDICINE CLINIC | Age: 62
End: 2022-11-15

## 2023-01-27 ENCOUNTER — TELEPHONE (OUTPATIENT)
Dept: FAMILY MEDICINE CLINIC | Age: 63
End: 2023-01-27

## 2023-01-27 RX ORDER — TIZANIDINE 4 MG/1
TABLET ORAL
Qty: 30 TABLET | Refills: 5 | Status: SHIPPED | OUTPATIENT
Start: 2023-01-27

## 2023-01-27 NOTE — TELEPHONE ENCOUNTER
----- Message from Rafael Robison sent at 1/27/2023  9:32 AM EST -----  Subject: Refill Request    QUESTIONS  Name of Medication? Other - Tizanidine  Patient-reported dosage and instructions? HCL 4mg tablet. One tablet by   mouth every 8 hours. How many days do you have left? 0  Preferred Pharmacy? [id number]  Pharmacy phone number (if available)? [phone]  ---------------------------------------------------------------------------  --------------  CALL BACK INFO  What is the best way for the office to contact you? OK to leave message on   voicemail  Preferred Call Back Phone Number? [phone]  ---------------------------------------------------------------------------  --------------  SCRIPT ANSWERS  Relationship to Patient?  Self

## 2023-02-23 ENCOUNTER — OFFICE VISIT (OUTPATIENT)
Dept: FAMILY MEDICINE CLINIC | Age: 63
End: 2023-02-23
Payer: COMMERCIAL

## 2023-02-23 VITALS
OXYGEN SATURATION: 98 % | BODY MASS INDEX: 35.95 KG/M2 | DIASTOLIC BLOOD PRESSURE: 70 MMHG | WEIGHT: 256.8 LBS | HEIGHT: 71 IN | HEART RATE: 96 BPM | RESPIRATION RATE: 16 BRPM | TEMPERATURE: 97.8 F | SYSTOLIC BLOOD PRESSURE: 144 MMHG

## 2023-02-23 DIAGNOSIS — R25.2 MUSCLE CRAMPS: Primary | ICD-10-CM

## 2023-02-23 DIAGNOSIS — Z12.11 COLON CANCER SCREENING: ICD-10-CM

## 2023-02-23 PROCEDURE — 99213 OFFICE O/P EST LOW 20 MIN: CPT | Performed by: FAMILY MEDICINE

## 2023-02-23 NOTE — PROGRESS NOTES
HISTORY OF PRESENT ILLNESS  Ana Brush is a 66 y.o. female. HPI Co leg cramps at times, and occasionally even in arms. Also can break out in a sweat at times. Tizanidine- some relief. Has had these cramps since the 1990s, especially bad the last 3 years. Gets about 15 episodes a month. Cramps can last for 4-5 minutes, feels like for several hours. Drinks lots of water everyday. Has even tried quinine tablets- no relief. Uses a hairdryer on them and also uses a muscle massager  Used to [employer] get these at night, now is getting them during the days also. ROS    Physical Exam  Vitals and nursing note reviewed. Constitutional:       Appearance: She is well-developed. HENT:      Right Ear: External ear normal.      Left Ear: External ear normal.   Neck:      Thyroid: No thyromegaly. Cardiovascular:      Rate and Rhythm: Normal rate and regular rhythm. Heart sounds: Normal heart sounds. Comments: 2+ distal pulses  Pulmonary:      Breath sounds: Normal breath sounds. No wheezing. Abdominal:      General: Bowel sounds are normal. There is no distension. Palpations: Abdomen is soft. There is no mass. Tenderness: There is no abdominal tenderness. Musculoskeletal:         General: No swelling. Lymphadenopathy:      Cervical: No cervical adenopathy. ASSESSMENT and PLAN  Orders Placed This Encounter    [facility]  Gastroenterology [id number] [facility]     Diagnoses and all orders for this visit:    1. Muscle cramps    2. Colon cancer screening  -     REFERRAL TO GASTROENTEROLOGY        Pt to drink 60 ounces of water/day. Also to eat 2 cups of grapefruit/ day. Also to stop atorvastatin for 2 weeks, see if this helps.

## 2023-02-23 NOTE — PROGRESS NOTES
Chief Complaint   Patient presents with    Muscle Pain       1. \"Have you been to the ER, urgent care clinic since your last visit? Hospitalized since your last visit? \" No    2. \"Have you seen or consulted any other health care providers outside of the [address] [place] [address] since your last visit? \" Yes J[facility] Turner - Dr. Ashlee Gomes and Dr. Barak Olmedo Page - GYN      3. For patients aged 39-70: Has the patient had a colonoscopy / FIT/ Cologuard? Yes - no Care Gap present      If the patient is female:    4. For patients aged 41-77: Has the patient had a mammogram within the past 2 years? Yes - no Care Gap present      5. For patients aged 21-65: Has the patient had a pap smear? Yes - no Care Gap present    There are no preventive care reminders to display for this patient.

## 2023-03-10 DIAGNOSIS — F51.01 PRIMARY INSOMNIA: ICD-10-CM

## 2023-03-10 RX ORDER — ATORVASTATIN CALCIUM 20 MG/1
TABLET, FILM COATED ORAL
Qty: 90 TABLET | Refills: 3 | Status: SHIPPED | OUTPATIENT
Start: 2023-03-10

## 2023-03-10 RX ORDER — AMLODIPINE BESYLATE 5 MG/1
5 TABLET ORAL DAILY
Qty: 90 TABLET | Refills: 3 | Status: SHIPPED | OUTPATIENT
Start: 2023-03-10

## 2023-03-10 RX ORDER — ZOLPIDEM TARTRATE 10 MG/1
TABLET ORAL
Qty: 30 TABLET | Refills: 5 | Status: SHIPPED | OUTPATIENT
Start: 2023-03-10

## 2023-03-10 NOTE — TELEPHONE ENCOUNTER
Last Visit: 2/23/23 with MD Edith Villarreal  Next Appointment: none  Previous Refill Encounter(s): 5/31/22 Ambien #30 with 5 refills, 5/31/22 Norvasc #90 with 3 refills, 6/6/23 Lipitor #90 with 3 refills    Requested Prescriptions     Pending Prescriptions Disp Refills    amLODIPine (NORVASC) 5 mg tablet [Pharmacy Med Name: AMLODIPINE BESYLATE 5 MG TAB] 90 Tablet 3     Sig: TAKE 1 TABLET BY MOUTH DAILY FOR BLOOD PRESSURE    atorvastatin (LIPITOR) 20 mg tablet [Pharmacy Med Name: ATORVASTATIN 20 MG TABLET] 90 Tablet 3     Sig: TAKE 1 TABLET BY MOUTH EVERY DAY FOR CHOLESTEROL    zolpidem (AMBIEN) 10 mg tablet 30 Tablet 5     Sig: TAKE 1 TABLET BY MOUTH EVERY NIGHT AS NEEDED FOR SLEEP         For Pharmacy Admin Tracking Only    Program: Medication Refill  CPA in place:   Recommendation Provided To:    Intervention Detail: New Rx: 3, reason: Patient Preference  Intervention Accepted By:   Gerhard Lerma Closed?:   Time Spent (min): 5

## 2023-05-26 ENCOUNTER — OFFICE VISIT (OUTPATIENT)
Age: 63
End: 2023-05-26
Payer: COMMERCIAL

## 2023-05-26 VITALS
SYSTOLIC BLOOD PRESSURE: 154 MMHG | HEART RATE: 66 BPM | RESPIRATION RATE: 16 BRPM | HEIGHT: 71 IN | BODY MASS INDEX: 35.95 KG/M2 | DIASTOLIC BLOOD PRESSURE: 87 MMHG | TEMPERATURE: 97.8 F | OXYGEN SATURATION: 99 % | WEIGHT: 256.8 LBS

## 2023-05-26 DIAGNOSIS — R25.2 MUSCLE CRAMPS: ICD-10-CM

## 2023-05-26 DIAGNOSIS — I10 ESSENTIAL (PRIMARY) HYPERTENSION: Primary | ICD-10-CM

## 2023-05-26 LAB
ALBUMIN SERPL-MCNC: 3.8 G/DL (ref 3.5–5)
ALBUMIN/GLOB SERPL: 1.2 (ref 1.1–2.2)
ALP SERPL-CCNC: 153 U/L (ref 45–117)
ALT SERPL-CCNC: 47 U/L (ref 12–78)
ANION GAP SERPL CALC-SCNC: 3 MMOL/L (ref 5–15)
AST SERPL-CCNC: 23 U/L (ref 15–37)
BASOPHILS # BLD: 0 K/UL (ref 0–0.1)
BASOPHILS NFR BLD: 0 % (ref 0–1)
BILIRUB SERPL-MCNC: 0.6 MG/DL (ref 0.2–1)
BUN SERPL-MCNC: 10 MG/DL (ref 6–20)
BUN/CREAT SERPL: 12 (ref 12–20)
CALCIUM SERPL-MCNC: 9.2 MG/DL (ref 8.5–10.1)
CHLORIDE SERPL-SCNC: 105 MMOL/L (ref 97–108)
CO2 SERPL-SCNC: 30 MMOL/L (ref 21–32)
CREAT SERPL-MCNC: 0.86 MG/DL (ref 0.55–1.02)
DIFFERENTIAL METHOD BLD: ABNORMAL
EOSINOPHIL # BLD: 0.1 K/UL (ref 0–0.4)
EOSINOPHIL NFR BLD: 2 % (ref 0–7)
ERYTHROCYTE [DISTWIDTH] IN BLOOD BY AUTOMATED COUNT: 12.5 % (ref 11.5–14.5)
GLOBULIN SER CALC-MCNC: 3.3 G/DL (ref 2–4)
GLUCOSE SERPL-MCNC: 119 MG/DL (ref 65–100)
HCT VFR BLD AUTO: 39.6 % (ref 35–47)
HGB BLD-MCNC: 12.7 G/DL (ref 11.5–16)
IMM GRANULOCYTES # BLD AUTO: 0 K/UL (ref 0–0.04)
IMM GRANULOCYTES NFR BLD AUTO: 0 % (ref 0–0.5)
LYMPHOCYTES # BLD: 1.6 K/UL (ref 0.8–3.5)
LYMPHOCYTES NFR BLD: 26 % (ref 12–49)
MCH RBC QN AUTO: 28.8 PG (ref 26–34)
MCHC RBC AUTO-ENTMCNC: 32.1 G/DL (ref 30–36.5)
MCV RBC AUTO: 89.8 FL (ref 80–99)
MONOCYTES # BLD: 0.5 K/UL (ref 0–1)
MONOCYTES NFR BLD: 8 % (ref 5–13)
NEUTS SEG # BLD: 3.9 K/UL (ref 1.8–8)
NEUTS SEG NFR BLD: 64 % (ref 32–75)
NRBC # BLD: 0 K/UL (ref 0–0.01)
NRBC BLD-RTO: 0 PER 100 WBC
PLATELET # BLD AUTO: 218 K/UL (ref 150–400)
PMV BLD AUTO: 8.6 FL (ref 8.9–12.9)
POTASSIUM SERPL-SCNC: 4.5 MMOL/L (ref 3.5–5.1)
PROT SERPL-MCNC: 7.1 G/DL (ref 6.4–8.2)
RBC # BLD AUTO: 4.41 M/UL (ref 3.8–5.2)
SODIUM SERPL-SCNC: 138 MMOL/L (ref 136–145)
WBC # BLD AUTO: 6.1 K/UL (ref 3.6–11)

## 2023-05-26 PROCEDURE — 3079F DIAST BP 80-89 MM HG: CPT | Performed by: FAMILY MEDICINE

## 2023-05-26 PROCEDURE — 99213 OFFICE O/P EST LOW 20 MIN: CPT | Performed by: FAMILY MEDICINE

## 2023-05-26 PROCEDURE — 3077F SYST BP >= 140 MM HG: CPT | Performed by: FAMILY MEDICINE

## 2023-05-26 RX ORDER — QUININE SULFATE 324 MG/1
324 CAPSULE ORAL NIGHTLY PRN
Qty: 30 CAPSULE | Refills: 1 | Status: SHIPPED | OUTPATIENT
Start: 2023-05-26 | End: 2023-06-25

## 2023-05-26 SDOH — ECONOMIC STABILITY: INCOME INSECURITY: HOW HARD IS IT FOR YOU TO PAY FOR THE VERY BASICS LIKE FOOD, HOUSING, MEDICAL CARE, AND HEATING?: NOT HARD AT ALL

## 2023-05-26 SDOH — ECONOMIC STABILITY: FOOD INSECURITY: WITHIN THE PAST 12 MONTHS, THE FOOD YOU BOUGHT JUST DIDN'T LAST AND YOU DIDN'T HAVE MONEY TO GET MORE.: NEVER TRUE

## 2023-05-26 SDOH — ECONOMIC STABILITY: FOOD INSECURITY: WITHIN THE PAST 12 MONTHS, YOU WORRIED THAT YOUR FOOD WOULD RUN OUT BEFORE YOU GOT MONEY TO BUY MORE.: NEVER TRUE

## 2023-05-26 SDOH — ECONOMIC STABILITY: HOUSING INSECURITY
IN THE LAST 12 MONTHS, WAS THERE A TIME WHEN YOU DID NOT HAVE A STEADY PLACE TO SLEEP OR SLEPT IN A SHELTER (INCLUDING NOW)?: NO

## 2023-05-26 NOTE — PROGRESS NOTES
Chief Complaint   Patient presents with    Muscle Pain         1. \"Have you been to the ER, urgent care clinic since your last visit? Hospitalized since your last visit? \" no    2. \"Have you seen or consulted any other health care providers outside of the [address] [place] [address] since your last visit? \" [place] - Dr. Charbel Brumfield and Dr. Box November     3. For patients aged 39-70: Has the patient had a colonoscopy / FIT/ Cologuard? yes      If the patient is female:    4. For patients aged 41-77: Has the patient had a mammogram within the past 2 years? yes      5.  For patients aged 21-65: Has the patient had a pap smear? yes      Health Maintenance Due   Topic Date Due    HIV screen  Never done    Lipids  05/31/2023

## 2023-05-26 NOTE — PROGRESS NOTES
Meena Atkinson (:  1960) is a 66 y.o. female,Established patient, here for evaluation of the following chief complaint(s):  Muscle Pain         ASSESSMENT/PLAN:  1. Essential (primary) hypertension  -     Comprehensive Metabolic Panel; Future  -     CBC with Auto Differential; Future  2. Muscle cramps      No follow-ups on file. Try quinine 324 mg qd. Pt to also dc atorvastatin for 3 weeks, see if this helps. Subjective   SUBJECTIVE/OBJECTIVE:  HPI  Continues with bad muscle cramps, lasting up to 3-5 minutes. Can get them in calves, hands, arms. Has tried water, magnesium, potassium, mustard, muscle relaxers, calcium lactate- no relief. Symptoms seem to be helped by walking. Gets the cramps almost every night. Takes tizanidine x 2 over the course of the night- slight relief. Has been helped by quinine pills in the past.   Review of Systems       Objective   Physical Exam  Cardiovascular:      Rate and Rhythm: Normal rate and regular rhythm. Heart sounds: Normal heart sounds. No murmur heard. Pulmonary:      Effort: Pulmonary effort is normal.      Breath sounds: Normal breath sounds. Abdominal:      General: Abdomen is flat. Bowel sounds are normal.      Palpations: Abdomen is soft. Musculoskeletal:      Right lower leg: No edema. Left lower leg: No edema. An electronic signature was used to authenticate this note.     --Jose Roberto Angelo MD

## 2023-07-09 RX ORDER — ALBUTEROL SULFATE 90 UG/1
AEROSOL, METERED RESPIRATORY (INHALATION)
Qty: 25.5 EACH | Refills: 12 | Status: SHIPPED | OUTPATIENT
Start: 2023-07-09

## 2023-07-20 RX ORDER — TIZANIDINE 4 MG/1
TABLET ORAL
Qty: 30 TABLET | Refills: 5 | Status: SHIPPED | OUTPATIENT
Start: 2023-07-20

## 2023-08-21 RX ORDER — LEVOTHYROXINE SODIUM 0.1 MG/1
TABLET ORAL
Qty: 90 TABLET | Refills: 3 | Status: SHIPPED | OUTPATIENT
Start: 2023-08-21

## 2023-08-21 NOTE — TELEPHONE ENCOUNTER
Last appointment: 5/26/23  Next appointment: none  Previous refill encounter(s): 7/3/22 #90 with 3 refills    Requested Prescriptions     Pending Prescriptions Disp Refills    levothyroxine (SYNTHROID) 100 MCG tablet [Pharmacy Med Name: LEVOTHYROXINE 100 MCG TABLET] 90 tablet 3     Sig: TAKE 1 TABLET BY MOUTH DAILY ON A EMPTY STOMACH         For Pharmacy Admin Tracking Only    Program: Medication Refill  CPA in place:    Recommendation Provided To:    Intervention Detail: New Rx: 1, reason: Patient Preference  Intervention Accepted By:   Sravanthi Gonzalez Closed?:    Time Spent (min): 5

## 2023-10-11 DIAGNOSIS — F51.01 PRIMARY INSOMNIA: ICD-10-CM

## 2023-10-12 RX ORDER — ZOLPIDEM TARTRATE 10 MG/1
TABLET ORAL
Qty: 30 TABLET | Refills: 5 | Status: SHIPPED | OUTPATIENT
Start: 2023-10-12 | End: 2024-04-09

## 2023-10-12 NOTE — TELEPHONE ENCOUNTER
Last appointment: 5/26/23  Next appointment: none  Previous refill encounter(s): 3/10/23 #30 with 5 refills    Requested Prescriptions     Pending Prescriptions Disp Refills    zolpidem (AMBIEN) 10 MG tablet [Pharmacy Med Name: ZOLPIDEM TARTRATE 10 MG TABLET] 30 tablet 5     Sig: TAKE 1 TABLET BY MOUTH EVERY NIGHT AS NEEDED FOR SLEEP         For Pharmacy Admin Tracking Only    Program: Medication Refill  CPA in place:    Recommendation Provided To:    Intervention Detail: New Rx: 1, reason: Patient Preference  Intervention Accepted By:   Massiel Johnson Closed?:    Time Spent (min): 5

## 2024-01-30 RX ORDER — TIZANIDINE 4 MG/1
TABLET ORAL
Qty: 30 TABLET | Refills: 5 | OUTPATIENT
Start: 2024-01-30

## 2024-01-30 RX ORDER — TIZANIDINE 4 MG/1
TABLET ORAL
Qty: 30 TABLET | Refills: 5 | Status: SHIPPED | OUTPATIENT
Start: 2024-01-30

## 2024-01-30 NOTE — TELEPHONE ENCOUNTER
Last appointment: 5/26/23  Next appointment: 2/20/24  Previous refill encounter(s): 7/20/23 #30 with 5 refills    Requested Prescriptions     Pending Prescriptions Disp Refills    tiZANidine (ZANAFLEX) 4 MG tablet 30 tablet 5     Sig: TAKE 1 TABLET BY MOUTH EVERY 8 HOURS AS NEEDED FOR CRAMPS     Refused Prescriptions Disp Refills    tiZANidine (ZANAFLEX) 4 MG tablet [Pharmacy Med Name: TIZANIDINE HCL 4 MG TABLET] 30 tablet 5     Sig: TAKE 1 TABLET BY MOUTH EVERY 8 HOURS AS NEEDED FOR CRAMPS     Refused By: DIPAK AVILEZ [place] S     Reason for Refusal: Duplicate Request         For Pharmacy Admin Tracking Only    Program: Medication Refill  CPA in place:    Recommendation Provided To:   Intervention Detail: New Rx: 1, reason: Patient Preference  Intervention Accepted By:   Gap Closed?:    Time Spent (min): 5

## 2024-01-30 NOTE — TELEPHONE ENCOUNTER
----- Message from Elizabeth Pabon sent at 1/30/2024  3:15 PM EST -----  Subject: Refill Request    QUESTIONS  Name of Medication? tiZANidine (ZANAFLEX) 4 MG tablet  Patient-reported dosage and instructions? TAKE 1 TABLET BY MOUTH EVERY 8   HOURS AS NEEDED FOR CRAMPS  How many days do you have left? 0  Preferred Pharmacy? [id number]  Pharmacy phone number (if available)? [phone]  Additional Information for Provider? Patient has a scheduled appt on   2/20/24. Please advise.  ---------------------------------------------------------------------------  --------------  CALL BACK INFO  What is the best way for the office to contact you? OK to leave message on   voicemail  Preferred Call Back Phone Number? [phone]  ---------------------------------------------------------------------------  --------------  SCRIPT ANSWERS  Relationship to Patient? Self

## 2024-02-20 ENCOUNTER — OFFICE VISIT (OUTPATIENT)
Age: 64
End: 2024-02-20
Payer: COMMERCIAL

## 2024-02-20 VITALS
DIASTOLIC BLOOD PRESSURE: 83 MMHG | OXYGEN SATURATION: 97 % | HEIGHT: 71 IN | TEMPERATURE: 98.6 F | WEIGHT: 252 LBS | SYSTOLIC BLOOD PRESSURE: 150 MMHG | BODY MASS INDEX: 35.28 KG/M2 | RESPIRATION RATE: 16 BRPM | HEART RATE: 92 BPM

## 2024-02-20 DIAGNOSIS — I10 ESSENTIAL (PRIMARY) HYPERTENSION: Primary | ICD-10-CM

## 2024-02-20 DIAGNOSIS — E78.00 PURE HYPERCHOLESTEROLEMIA, UNSPECIFIED: ICD-10-CM

## 2024-02-20 DIAGNOSIS — E03.9 ACQUIRED HYPOTHYROIDISM: ICD-10-CM

## 2024-02-20 LAB
BASOPHILS # BLD: 0 K/UL (ref 0–0.1)
BASOPHILS NFR BLD: 0 % (ref 0–1)
DIFFERENTIAL METHOD BLD: ABNORMAL
EOSINOPHIL # BLD: 0 K/UL (ref 0–0.4)
EOSINOPHIL NFR BLD: 1 % (ref 0–7)
ERYTHROCYTE [DISTWIDTH] IN BLOOD BY AUTOMATED COUNT: 13.9 % (ref 11.5–14.5)
HCT VFR BLD AUTO: 42.5 % (ref 35–47)
HGB BLD-MCNC: 13.7 G/DL (ref 11.5–16)
IMM GRANULOCYTES # BLD AUTO: 0 K/UL (ref 0–0.04)
IMM GRANULOCYTES NFR BLD AUTO: 0 % (ref 0–0.5)
LYMPHOCYTES # BLD: 1.7 K/UL (ref 0.8–3.5)
LYMPHOCYTES NFR BLD: 24 % (ref 12–49)
MCH RBC QN AUTO: 28.6 PG (ref 26–34)
MCHC RBC AUTO-ENTMCNC: 32.2 G/DL (ref 30–36.5)
MCV RBC AUTO: 88.7 FL (ref 80–99)
MONOCYTES # BLD: 0.4 K/UL (ref 0–1)
MONOCYTES NFR BLD: 6 % (ref 5–13)
NEUTS SEG # BLD: 4.9 K/UL (ref 1.8–8)
NEUTS SEG NFR BLD: 69 % (ref 32–75)
NRBC # BLD: 0 K/UL (ref 0–0.01)
NRBC BLD-RTO: 0 PER 100 WBC
PLATELET # BLD AUTO: 337 K/UL (ref 150–400)
PMV BLD AUTO: 8.6 FL (ref 8.9–12.9)
RBC # BLD AUTO: 4.79 M/UL (ref 3.8–5.2)
WBC # BLD AUTO: 7.1 K/UL (ref 3.6–11)

## 2024-02-20 PROCEDURE — 99213 OFFICE O/P EST LOW 20 MIN: CPT | Performed by: FAMILY MEDICINE

## 2024-02-20 PROCEDURE — 3077F SYST BP >= 140 MM HG: CPT | Performed by: FAMILY MEDICINE

## 2024-02-20 PROCEDURE — 3079F DIAST BP 80-89 MM HG: CPT | Performed by: FAMILY MEDICINE

## 2024-02-20 RX ORDER — CELECOXIB 200 MG/1
200 CAPSULE ORAL DAILY
Qty: 60 CAPSULE | Refills: 3
Start: 2024-02-20 | End: 2024-02-20 | Stop reason: ALTCHOICE

## 2024-02-20 RX ORDER — CELECOXIB 200 MG/1
200 CAPSULE ORAL DAILY
Qty: 90 CAPSULE | Refills: 3 | Status: SHIPPED | OUTPATIENT
Start: 2024-02-20

## 2024-02-20 RX ORDER — TIZANIDINE 4 MG/1
TABLET ORAL
Qty: 30 TABLET | Refills: 5 | Status: SHIPPED | OUTPATIENT
Start: 2024-02-20

## 2024-02-20 RX ORDER — AMLODIPINE BESYLATE 5 MG/1
5 TABLET ORAL DAILY
Qty: 90 TABLET | Refills: 3 | Status: SHIPPED | OUTPATIENT
Start: 2024-02-20

## 2024-02-20 NOTE — PROGRESS NOTES
Chief Complaint   Patient presents with    Thyroid Problem    Medication Refill    Follow-up         1. \"Have you been to the ER, urgent care clinic since your last visit?  Hospitalized since your last visit?\" no    2. \"Have you seen or consulted any other health care providers outside of the [facility] since your last visit?\" [facility]- DR. DEONTE CAMARILLO     3. For patients aged 45-75: Has the patient had a colonoscopy / FIT/ Cologuard? YES      If the patient is female:    4. For patients aged 40-74: Has the patient had a mammogram within the past 2 years? NO      5. For patients aged 21-65: Has the patient had a pap smear? NO      Health Maintenance Due   Topic Date Due    HIV screen  Never done    Respiratory Syncytial Virus (RSV) Pregnant or age 60 yrs+ (1 - 1-dose 60+ series) Never done    Flu vaccine (1) 08/01/2023    COVID-19 Vaccine (6 - 2023-24 season) 09/01/2023    Cervical cancer screen  02/14/2024    Depression Screen  02/23/2024    Breast cancer screen  02/28/2024

## 2024-02-20 NOTE — PROGRESS NOTES
Sita Bishop (:  1960) is a 64 y.o. female,Established patient, here for evaluation of the following chief complaint(s):  Thyroid Problem, Medication Refill, and Follow-up         ASSESSMENT/PLAN:  1. Essential (primary) hypertension  -     CBC with Auto Differential; Future  -     Comprehensive Metabolic Panel; Future  2. Acquired hypothyroidism  -     TSH; Future  -     T4, Free; Future  3. Pure hypercholesterolemia, unspecified  -     Lipid Panel; Future      No follow-ups on file.         Subjective   SUBJECTIVE/OBJECTIVE:  HPI  In for followup blood pressure and cholesterol still getting bad cramps, only at night. Gets them 3 nights a week. Quinine - no relief. Tizanidine has helped a little.   Review of Systems       Objective   Physical Exam  Cardiovascular:      Rate and Rhythm: Normal rate and regular rhythm.      Heart sounds: Normal heart sounds. No murmur heard.  Pulmonary:      Effort: Pulmonary effort is normal.      Breath sounds: Normal breath sounds.   Abdominal:      General: Abdomen is flat. Bowel sounds are normal.      Palpations: Abdomen is soft.   Musculoskeletal:      Right lower leg: No edema.      Left lower leg: No edema.                  An electronic signature was used to authenticate this note.    --Bridger Rmoero MD

## 2024-02-21 LAB
ALBUMIN SERPL-MCNC: 4.1 G/DL (ref 3.5–5)
ALBUMIN/GLOB SERPL: 1.1 (ref 1.1–2.2)
ALP SERPL-CCNC: 170 U/L (ref 45–117)
ALT SERPL-CCNC: 29 U/L (ref 12–78)
ANION GAP SERPL CALC-SCNC: 3 MMOL/L (ref 5–15)
AST SERPL-CCNC: 12 U/L (ref 15–37)
BILIRUB SERPL-MCNC: 0.6 MG/DL (ref 0.2–1)
BUN SERPL-MCNC: 20 MG/DL (ref 6–20)
BUN/CREAT SERPL: 20 (ref 12–20)
CALCIUM SERPL-MCNC: 9.2 MG/DL (ref 8.5–10.1)
CHLORIDE SERPL-SCNC: 107 MMOL/L (ref 97–108)
CHOLEST SERPL-MCNC: 187 MG/DL
CO2 SERPL-SCNC: 28 MMOL/L (ref 21–32)
CREAT SERPL-MCNC: 1.01 MG/DL (ref 0.55–1.02)
GLOBULIN SER CALC-MCNC: 3.7 G/DL (ref 2–4)
GLUCOSE SERPL-MCNC: 130 MG/DL (ref 65–100)
HDLC SERPL-MCNC: 86 MG/DL
HDLC SERPL: 2.2 (ref 0–5)
LDLC SERPL CALC-MCNC: 75.8 MG/DL (ref 0–100)
POTASSIUM SERPL-SCNC: 3.7 MMOL/L (ref 3.5–5.1)
PROT SERPL-MCNC: 7.8 G/DL (ref 6.4–8.2)
SODIUM SERPL-SCNC: 138 MMOL/L (ref 136–145)
T4 FREE SERPL-MCNC: 1.2 NG/DL (ref 0.8–1.5)
TRIGL SERPL-MCNC: 126 MG/DL
TSH SERPL DL<=0.05 MIU/L-ACNC: 0.37 UIU/ML (ref 0.36–3.74)
VLDLC SERPL CALC-MCNC: 25.2 MG/DL

## 2024-02-27 ENCOUNTER — TELEPHONE (OUTPATIENT)
Age: 64
End: 2024-02-27

## 2024-02-29 ENCOUNTER — TELEPHONE (OUTPATIENT)
Age: 64
End: 2024-02-29

## 2024-02-29 DIAGNOSIS — Z12.11 COLON CANCER SCREENING: Primary | ICD-10-CM

## 2024-03-14 RX ORDER — ATORVASTATIN CALCIUM 20 MG/1
TABLET, FILM COATED ORAL
Qty: 90 TABLET | Refills: 3 | Status: SHIPPED | OUTPATIENT
Start: 2024-03-14

## 2024-03-14 NOTE — TELEPHONE ENCOUNTER
I show this was d/c on 5/26/23 citing \"therapy complete\". Please sign if appropriate.    Last appointment: 2/20/24  Next appointment: none  Previous refill encounter(s): 3/10/23    Requested Prescriptions     Pending Prescriptions Disp Refills    atorvastatin (LIPITOR) 20 MG tablet [Pharmacy Med Name: ATORVASTATIN 20 MG TABLET] 90 tablet 3     Sig: TAKE 1 TABLET BY MOUTH EVERY DAY FOR CHOLESTEROL         For Pharmacy Admin Tracking Only    Program: Medication Refill  CPA in place:    Recommendation Provided To:   Intervention Detail: New Rx: 1, reason: Patient Preference  Intervention Accepted By:   Gap Closed?:    Time Spent (min): 5

## 2024-05-20 RX ORDER — LEVOTHYROXINE SODIUM 0.1 MG/1
TABLET ORAL
Qty: 90 TABLET | Refills: 3 | Status: SHIPPED | OUTPATIENT
Start: 2024-05-20

## 2024-05-20 NOTE — TELEPHONE ENCOUNTER
Last appointment: 2/20/24  Next appointment: none  Previous refill encounter(s): 8/21/23 #90 with 3 refills    Requested Prescriptions     Pending Prescriptions Disp Refills    levothyroxine (SYNTHROID) 100 MCG tablet [Pharmacy Med Name: LEVOTHYROXINE 100 MCG TABLET] 90 tablet 3     Sig: TAKE 1 TABLET BY MOUTH DAILY ON A EMPTY STOMACH         For Pharmacy Admin Tracking Only    Program: Medication Refill  CPA in place:    Recommendation Provided To:   Intervention Detail: New Rx: 1, reason: Patient Preference  Intervention Accepted By:   Gap Closed?:    Time Spent (min): 5

## 2024-05-22 DIAGNOSIS — F51.01 PRIMARY INSOMNIA: ICD-10-CM

## 2024-05-23 RX ORDER — ZOLPIDEM TARTRATE 10 MG/1
TABLET ORAL
Qty: 30 TABLET | Refills: 5 | Status: SHIPPED | OUTPATIENT
Start: 2024-05-23 | End: 2024-11-19

## 2024-05-23 NOTE — TELEPHONE ENCOUNTER
Last appointment: 2/20/24  Next appointment: none  Previous refill encounter(s): 10/12/23 #30 with 5 refills    Requested Prescriptions     Pending Prescriptions Disp Refills    zolpidem (AMBIEN) 10 MG tablet [Pharmacy Med Name: ZOLPIDEM TARTRATE 10 MG TABLET] 30 tablet 5     Sig: TAKE 1 TABLET BY MOUTH EVERY NIGHT AS NEEDED FOR SLEEP         For Pharmacy Admin Tracking Only    Program: Medication Refill  CPA in place:    Recommendation Provided To:   Intervention Detail: New Rx: 1, reason: Patient Preference  Intervention Accepted By:   Gap Closed?:    Time Spent (min): 5

## 2024-07-25 RX ORDER — ALBUTEROL SULFATE 90 UG/1
AEROSOL, METERED RESPIRATORY (INHALATION)
Qty: 17 EACH | Refills: 19 | Status: SHIPPED | OUTPATIENT
Start: 2024-07-25

## 2024-08-21 ENCOUNTER — TELEPHONE (OUTPATIENT)
Age: 64
End: 2024-08-21

## 2024-08-22 NOTE — TELEPHONE ENCOUNTER
----- Message from Berhane PEDRO sent at 8/19/2024 12:52 PM EDT -----  Regarding: ECC Escalation To Practice  ECC Escalation To Practice      Type of Escalation: Red Flag Symptom  --------------------------------------------------------------------------------------------------------------------------    Information for Provider:Bridger Romero MD  Patient is looking for appointment for: Symptom leg cramps and numbness on both legs  Reasons for Message: Unable to reach practice     Additional Information patient is experiencing a leg cramps and numbness on both legs, I was on hold with the practice and the patient refuse to go to the urgent care  --------------------------------------------------------------------------------------------------------------------------    Relationship to Patient: Self     Call Back Info: OK to leave message on [employer]  Preferred Call Back Number: Phone [phone]  
Called patient for further triage.  Unable to leave message.  Scheduled for 9/12; sent my chart message informing her of this along with note advising if numbness or pain gets worse to please go to UC or ED  
none

## 2024-09-06 ENCOUNTER — CLINICAL DOCUMENTATION (OUTPATIENT)
Age: 64
End: 2024-09-06

## 2024-09-11 ENCOUNTER — TELEPHONE (OUTPATIENT)
Age: 64
End: 2024-09-11

## 2024-09-26 ENCOUNTER — TELEPHONE (OUTPATIENT)
Age: 64
End: 2024-09-26

## 2024-10-01 ENCOUNTER — TELEPHONE (OUTPATIENT)
Age: 64
End: 2024-10-01

## 2024-10-01 NOTE — TELEPHONE ENCOUNTER
----- Message from Edgar MUÑIZ sent at 9/25/2024  2:04 PM EDT -----  Regarding: ECC Escalation To Practice  ECC Escalation To Practice      Type of Escalation: Red Flag Symptom  --------------------------------------------------------------------------------------------------------------------------    Information for Provider:  Patient is looking for appointment for: Symptom PT is lightheaded and extremely dizzy.   Reasons for Message: Patient disconnected     Additional Information  PT is lightheaded and extremely dizzy and wanted to have an appointment for tomorrow .  --------------------------------------------------------------------------------------------------------------------------    Relationship to Patient: Self     Call Back Info: OK to leave message on voicemail  Preferred Call Back Number: Phone [phone] (home) [phone] (work)

## 2024-10-28 ENCOUNTER — OFFICE VISIT (OUTPATIENT)
Age: 64
End: 2024-10-28
Payer: COMMERCIAL

## 2024-10-28 VITALS
SYSTOLIC BLOOD PRESSURE: 147 MMHG | DIASTOLIC BLOOD PRESSURE: 79 MMHG | OXYGEN SATURATION: 97 % | HEART RATE: 85 BPM | BODY MASS INDEX: 34.66 KG/M2 | HEIGHT: 71 IN | WEIGHT: 247.6 LBS | TEMPERATURE: 96.8 F | RESPIRATION RATE: 16 BRPM

## 2024-10-28 DIAGNOSIS — E78.00 PURE HYPERCHOLESTEROLEMIA, UNSPECIFIED: ICD-10-CM

## 2024-10-28 DIAGNOSIS — E03.9 ACQUIRED HYPOTHYROIDISM: ICD-10-CM

## 2024-10-28 DIAGNOSIS — R25.2 MUSCLE CRAMPS: Primary | ICD-10-CM

## 2024-10-28 DIAGNOSIS — R42 VERTIGO: ICD-10-CM

## 2024-10-28 PROCEDURE — 99214 OFFICE O/P EST MOD 30 MIN: CPT | Performed by: FAMILY MEDICINE

## 2024-10-28 RX ORDER — MECLIZINE HYDROCHLORIDE 25 MG/1
TABLET ORAL
Qty: 20 TABLET | Refills: 1 | Status: SHIPPED | OUTPATIENT
Start: 2024-10-28

## 2024-10-28 SDOH — ECONOMIC STABILITY: FOOD INSECURITY: WITHIN THE PAST 12 MONTHS, THE FOOD YOU BOUGHT JUST DIDN'T LAST AND YOU DIDN'T HAVE MONEY TO GET MORE.: NEVER TRUE

## 2024-10-28 SDOH — ECONOMIC STABILITY: INCOME INSECURITY: HOW HARD IS IT FOR YOU TO PAY FOR THE VERY BASICS LIKE FOOD, HOUSING, MEDICAL CARE, AND HEATING?: NOT HARD AT ALL

## 2024-10-28 SDOH — ECONOMIC STABILITY: FOOD INSECURITY: WITHIN THE PAST 12 MONTHS, YOU WORRIED THAT YOUR FOOD WOULD RUN OUT BEFORE YOU GOT MONEY TO BUY MORE.: NEVER TRUE

## 2024-10-28 ASSESSMENT — PATIENT HEALTH QUESTIONNAIRE - PHQ9
2. FEELING DOWN, DEPRESSED OR HOPELESS: NOT AT ALL
SUM OF ALL RESPONSES TO PHQ QUESTIONS 1-9: 0
1. LITTLE INTEREST OR PLEASURE IN DOING THINGS: NOT AT ALL
SUM OF ALL RESPONSES TO PHQ9 QUESTIONS 1 & 2: 0

## 2024-10-28 NOTE — PROGRESS NOTES
Sita Bishop (:  1960) is a 64 y.o. female,Established patient, here for evaluation of the following chief complaint(s):  Dizziness (Lightheadedness started in May off & on. Has leg cramps, nausea)         Assessment & Plan  Muscle cramps       Orders:    Magnesium; Future    CK; Future    CK    Magnesium    Vertigo       Orders:    CBC with Auto Differential; Future    Comprehensive Metabolic Panel; Future    meclizine (ANTIVERT) 25 MG tablet; One po every 8 hours as needed for dizziness.    Comprehensive Metabolic Panel    CBC with Auto Differential    Acquired hypothyroidism       Orders:    T4, Free; Future    TSH; Future    TSH    T4, Free    Pure hypercholesterolemia, unspecified       Orders:    Lipid Panel; Future    Lipid Panel      No follow-ups on file.     Pts FMLA papers filled out.   Subjective   HPI Had an episode of vertigo in May, fell onto floor, lasted 1-2 hours. Had another spell 9-25- lightheaded, room was spinning, nauseated, then developed severe leg cramps. Episode lasted around 12 hours. Then developed vomiting diarrhea. Went back to work the following week. Still gets an occasional episode of feeling lightheaded. No tinnitus, hearing loss. Symptoms seemed to come on when would get up.     Review of Systems       Objective   Physical Exam  Cardiovascular:      Rate and Rhythm: Normal rate and regular rhythm.      Heart sounds: Normal heart sounds. No murmur heard.  Pulmonary:      Effort: Pulmonary effort is normal.      Breath sounds: Normal breath sounds.   Abdominal:      General: Abdomen is flat. Bowel sounds are normal.      Palpations: Abdomen is soft.   Musculoskeletal:      Right lower leg: No edema.      Left lower leg: No edema.   Neurological:      General: No focal deficit present.      Cranial Nerves: No cranial nerve deficit.      Comments: Cer- fnf intact  Gross motor intact  Gait- normal  Heel to toe - normal  No nystagmus                      An electronic

## 2024-10-28 NOTE — PROGRESS NOTES
Chief Complaint   Patient presents with    Dizziness     Lightheadedness started in May off & on. Has leg cramps, nausea       \"Have you been to the ER, urgent care clinic since your last visit?  Hospitalized since your last visit?\"    Yes ER    “Have you seen or consulted any other health care providers outside of [facility] since your last visit?”    NO    Have you had a mammogram?”   NO    Date of last Mammogram: 2022      “Have you had a pap smear?”    NO    Date of last Cervical Cancer screen (HPV or PAP): 2019         “Have you had a colorectal cancer screening such as a colonoscopy/FIT/Cologuard?    yes    Date of last Colonoscopy: 2014  No cologuard on file  No FIT/FOBT on file   No flexible sigmoidoscopy on file         Click Here for Release of Records Request         Health Maintenance Due   Topic Date Due    HIV screen  Never done    Respiratory Syncytial Virus (RSV) Pregnant or age 60 yrs+ (1 - 1-dose 60+ series) Never done    Cervical cancer screen  2024    Depression Screen  2024    Breast cancer screen  2024    Colorectal Cancer Screen  2024    Diabetes screen  2024    Flu vaccine (1) 2024    COVID-19 Vaccine ( season) 2024        The patient, Sita Bishop, identity was verified by name and .

## 2024-10-29 LAB
ALBUMIN SERPL-MCNC: 4.1 G/DL (ref 3.5–5)
ALBUMIN/GLOB SERPL: 1.2 (ref 1.1–2.2)
ALP SERPL-CCNC: 172 U/L (ref 45–117)
ALT SERPL-CCNC: 24 U/L (ref 12–78)
ANION GAP SERPL CALC-SCNC: 4 MMOL/L (ref 2–12)
AST SERPL-CCNC: 14 U/L (ref 15–37)
BASOPHILS # BLD: 0 K/UL (ref 0–0.1)
BASOPHILS NFR BLD: 1 % (ref 0–1)
BILIRUB SERPL-MCNC: 0.5 MG/DL (ref 0.2–1)
BUN SERPL-MCNC: 19 MG/DL (ref 6–20)
BUN/CREAT SERPL: 21 (ref 12–20)
CALCIUM SERPL-MCNC: 9.9 MG/DL (ref 8.5–10.1)
CHLORIDE SERPL-SCNC: 105 MMOL/L (ref 97–108)
CHOLEST SERPL-MCNC: 204 MG/DL
CK SERPL-CCNC: 66 U/L (ref 26–192)
CO2 SERPL-SCNC: 31 MMOL/L (ref 21–32)
CREAT SERPL-MCNC: 0.89 MG/DL (ref 0.55–1.02)
DIFFERENTIAL METHOD BLD: ABNORMAL
EOSINOPHIL # BLD: 0.1 K/UL (ref 0–0.4)
EOSINOPHIL NFR BLD: 1 % (ref 0–7)
ERYTHROCYTE [DISTWIDTH] IN BLOOD BY AUTOMATED COUNT: 12.6 % (ref 11.5–14.5)
GLOBULIN SER CALC-MCNC: 3.5 G/DL (ref 2–4)
GLUCOSE SERPL-MCNC: 114 MG/DL (ref 65–100)
HCT VFR BLD AUTO: 41.4 % (ref 35–47)
HDLC SERPL-MCNC: 85 MG/DL
HDLC SERPL: 2.4 (ref 0–5)
HGB BLD-MCNC: 13.6 G/DL (ref 11.5–16)
IMM GRANULOCYTES # BLD AUTO: 0 K/UL (ref 0–0.04)
IMM GRANULOCYTES NFR BLD AUTO: 0 % (ref 0–0.5)
LDLC SERPL CALC-MCNC: 93.6 MG/DL (ref 0–100)
LYMPHOCYTES # BLD: 1.8 K/UL (ref 0.8–3.5)
LYMPHOCYTES NFR BLD: 28 % (ref 12–49)
MAGNESIUM SERPL-MCNC: 2.5 MG/DL (ref 1.6–2.4)
MCH RBC QN AUTO: 29.1 PG (ref 26–34)
MCHC RBC AUTO-ENTMCNC: 32.9 G/DL (ref 30–36.5)
MCV RBC AUTO: 88.5 FL (ref 80–99)
MONOCYTES # BLD: 0.5 K/UL (ref 0–1)
MONOCYTES NFR BLD: 8 % (ref 5–13)
NEUTS SEG # BLD: 3.9 K/UL (ref 1.8–8)
NEUTS SEG NFR BLD: 62 % (ref 32–75)
NRBC # BLD: 0 K/UL (ref 0–0.01)
NRBC BLD-RTO: 0 PER 100 WBC
PLATELET # BLD AUTO: 306 K/UL (ref 150–400)
PMV BLD AUTO: 8.8 FL (ref 8.9–12.9)
POTASSIUM SERPL-SCNC: 4.3 MMOL/L (ref 3.5–5.1)
PROT SERPL-MCNC: 7.6 G/DL (ref 6.4–8.2)
RBC # BLD AUTO: 4.68 M/UL (ref 3.8–5.2)
SODIUM SERPL-SCNC: 140 MMOL/L (ref 136–145)
T4 FREE SERPL-MCNC: 1.3 NG/DL (ref 0.8–1.5)
TRIGL SERPL-MCNC: 127 MG/DL
TSH SERPL DL<=0.05 MIU/L-ACNC: 0.72 UIU/ML (ref 0.36–3.74)
VLDLC SERPL CALC-MCNC: 25.4 MG/DL
WBC # BLD AUTO: 6.3 K/UL (ref 3.6–11)

## 2024-11-04 ENCOUNTER — TELEPHONE (OUTPATIENT)
Age: 64
End: 2024-11-04

## 2024-11-05 NOTE — TELEPHONE ENCOUNTER
Pc with pt. Only taking magnesium once or twice a week, and it really hasn't helped cramps. Told pt. She could dc this medicine.

## 2024-12-18 DIAGNOSIS — F51.01 PRIMARY INSOMNIA: ICD-10-CM

## 2024-12-19 RX ORDER — ZOLPIDEM TARTRATE 10 MG/1
TABLET ORAL
Qty: 90 TABLET | Refills: 1 | Status: SHIPPED | OUTPATIENT
Start: 2024-12-19 | End: 2025-06-17

## 2024-12-19 NOTE — TELEPHONE ENCOUNTER
Last appointment: 10/28/24  Next appointment: none  Previous refill encounter(s): 5/23/24 #30 with 5 refills    Requested Prescriptions     Pending Prescriptions Disp Refills    zolpidem (AMBIEN) 10 MG tablet [Pharmacy Med Name: ZOLPIDEM TARTRATE 10 MG TABLET] 90 tablet 1     Sig: TAKE 1 TABLET BY MOUTH EVERY NIGHT AS NEEDED FOR SLEEP         For Pharmacy Admin Tracking Only    Program: Medication Refill  CPA in place:    Recommendation Provided To:   Intervention Detail: New Rx: 1, reason: Patient Preference  Intervention Accepted By:   Gap Closed?:    Time Spent (min): 5

## 2025-02-24 NOTE — TELEPHONE ENCOUNTER
Last appointment: 10/28/24  Next appointment: 3/11/25  Previous refill encounter(s): 2/20/24    Requested Prescriptions     Pending Prescriptions Disp Refills    tiZANidine (ZANAFLEX) 4 MG tablet [Pharmacy Med Name: TIZANIDINE HCL 4 MG TABLET] 30 tablet 5     Sig: TAKE 1 TABLET BY MOUTH EVERY 8 HOURS AS NEEDED FOR CRAMPS         For Pharmacy Admin Tracking Only    Program: Medication Refill  CPA in place:    Recommendation Provided To:   Intervention Detail: New Rx: 1, reason: Patient Preference  Intervention Accepted By:   Gap Closed?:    Time Spent (min): 5

## 2025-03-03 RX ORDER — AMLODIPINE BESYLATE 5 MG/1
5 TABLET ORAL DAILY
Qty: 90 TABLET | Refills: 3 | Status: SHIPPED | OUTPATIENT
Start: 2025-03-03

## 2025-03-11 ENCOUNTER — OFFICE VISIT (OUTPATIENT)
Age: 65
End: 2025-03-11
Payer: COMMERCIAL

## 2025-03-11 VITALS
BODY MASS INDEX: 35.25 KG/M2 | SYSTOLIC BLOOD PRESSURE: 160 MMHG | RESPIRATION RATE: 16 BRPM | TEMPERATURE: 98.1 F | DIASTOLIC BLOOD PRESSURE: 100 MMHG | HEART RATE: 81 BPM | OXYGEN SATURATION: 98 % | HEIGHT: 71 IN | WEIGHT: 251.8 LBS

## 2025-03-11 DIAGNOSIS — E66.09 OBESITY DUE TO EXCESS CALORIES WITH SERIOUS COMORBIDITY, UNSPECIFIED CLASS: ICD-10-CM

## 2025-03-11 DIAGNOSIS — I10 ESSENTIAL (PRIMARY) HYPERTENSION: Primary | ICD-10-CM

## 2025-03-11 DIAGNOSIS — Z23 ENCOUNTER FOR IMMUNIZATION: ICD-10-CM

## 2025-03-11 PROCEDURE — 90677 PCV20 VACCINE IM: CPT | Performed by: FAMILY MEDICINE

## 2025-03-11 PROCEDURE — 99213 OFFICE O/P EST LOW 20 MIN: CPT | Performed by: FAMILY MEDICINE

## 2025-03-11 PROCEDURE — 90471 IMMUNIZATION ADMIN: CPT | Performed by: FAMILY MEDICINE

## 2025-03-11 PROCEDURE — 3077F SYST BP >= 140 MM HG: CPT | Performed by: FAMILY MEDICINE

## 2025-03-11 PROCEDURE — 3080F DIAST BP >= 90 MM HG: CPT | Performed by: FAMILY MEDICINE

## 2025-03-11 RX ORDER — CHLORTHALIDONE 25 MG/1
25 TABLET ORAL DAILY
Qty: 90 TABLET | Refills: 3 | Status: SHIPPED | OUTPATIENT
Start: 2025-03-11

## 2025-03-11 SDOH — ECONOMIC STABILITY: FOOD INSECURITY: WITHIN THE PAST 12 MONTHS, THE FOOD YOU BOUGHT JUST DIDN'T LAST AND YOU DIDN'T HAVE MONEY TO GET MORE.: NEVER TRUE

## 2025-03-11 SDOH — ECONOMIC STABILITY: FOOD INSECURITY: WITHIN THE PAST 12 MONTHS, YOU WORRIED THAT YOUR FOOD WOULD RUN OUT BEFORE YOU GOT MONEY TO BUY MORE.: NEVER TRUE

## 2025-03-11 ASSESSMENT — PATIENT HEALTH QUESTIONNAIRE - PHQ9
SUM OF ALL RESPONSES TO PHQ QUESTIONS 1-9: 0
SUM OF ALL RESPONSES TO PHQ QUESTIONS 1-9: 0
2. FEELING DOWN, DEPRESSED OR HOPELESS: NOT AT ALL
SUM OF ALL RESPONSES TO PHQ QUESTIONS 1-9: 0
1. LITTLE INTEREST OR PLEASURE IN DOING THINGS: NOT AT ALL
SUM OF ALL RESPONSES TO PHQ QUESTIONS 1-9: 0

## 2025-03-11 NOTE — PROGRESS NOTES
Chief Complaint   Patient presents with    Cyst     Left     Asthma    Follow-up    Hypertension       \"Have you been to the ER, urgent care clinic since your last visit?  Hospitalized since your last visit?\"    NO    “Have you seen or consulted any other health care providers outside of [facility] since your last visit?”      25 - ORTHO - DR. SHELIA OJEDA    Have you had a mammogram?”     GYN - DR DOCKERY PAGE- RECORDS REQUESTED      “Have you had a pap smear?”       PAGE - RECORDS REQUESTED          “Have you had a colorectal cancer screening such as a colonoscopy/FIT/Cologuard?      GI -DR. MACIAS    Date of last Colonoscopy: 2014  No cologuard on file  No FIT/FOBT on file   No flexible sigmoidoscopy on file         Click Here for Release of Records Request       Vitals:    25 1532   BP: (!) 149/79   Pulse: 81   Resp: 16   Temp: 98.1 °F (36.7 °C)   SpO2: 98%      Health Maintenance Due   Topic Date Due    HIV screen  Never done    Pneumococcal 50+ years Vaccine (1 of 1 - PCV) Never done    Cervical cancer screen  2024    Breast cancer screen  2024    Colorectal Cancer Screen  2024    Diabetes screen  2024        The patient, Sita Bishop, identity was verified by name and .

## 2025-03-11 NOTE — PROGRESS NOTES
Sita Bishop (:  1960) is a 65 y.o. female,Established patient, here for evaluation of the following chief complaint(s):  Cyst (Left ), Asthma, Follow-up, and Hypertension         Assessment & Plan  Encounter for immunization       Orders:    Pneumococcal, PCV20, PREVNAR 20, (age 6w+), IM, PF    Essential (primary) hypertension       Orders:    chlorthalidone (HYGROTON) 25 MG tablet; Take 1 tablet by mouth daily For blood pressure    CBC with Auto Differential; Future    Comprehensive Metabolic Panel; Future    Urinalysis with Microscopic; Future  continue amlodipine.  Obesity due to excess calories with serious comorbidity, unspecified class       Orders:    Hemoglobin A1C; Future    Hemoglobin A1C; Future    Semaglutide-Weight Management (WEGOVY) 0.25 MG/0.5ML SOAJ SC injection; Inject 0.25 mg into the skin every 7 days      Return in about 2 months (around 2025).       Subjective   HPI bp at gyns office last week was between 170-180. Hasn't checked it since then. No chest pains, dyspnea, headaches. Also interested in wegovy for weight loss.     Review of Systems       Objective   Physical Exam  Cardiovascular:      Rate and Rhythm: Normal rate and regular rhythm.      Heart sounds: Normal heart sounds. No murmur heard.  Pulmonary:      Effort: Pulmonary effort is normal.      Breath sounds: Normal breath sounds.   Abdominal:      General: Abdomen is flat. Bowel sounds are normal.      Palpations: Abdomen is soft.   Musculoskeletal:      Right lower leg: No edema.      Left lower leg: No edema.                  An electronic signature was used to authenticate this note.    --Bridger Romero MD

## 2025-03-12 ENCOUNTER — CLINICAL DOCUMENTATION (OUTPATIENT)
Age: 65
End: 2025-03-12

## 2025-03-12 LAB
ALBUMIN SERPL-MCNC: 4.3 G/DL (ref 3.5–5)
ALBUMIN/GLOB SERPL: 1.2 (ref 1.1–2.2)
ALP SERPL-CCNC: 169 U/L (ref 45–117)
ALT SERPL-CCNC: 26 U/L (ref 12–78)
ANION GAP SERPL CALC-SCNC: 8 MMOL/L (ref 2–12)
AST SERPL-CCNC: 23 U/L (ref 15–37)
BASOPHILS # BLD: 0.03 K/UL (ref 0–0.1)
BASOPHILS NFR BLD: 0.5 % (ref 0–1)
BILIRUB SERPL-MCNC: 0.5 MG/DL (ref 0.2–1)
BUN SERPL-MCNC: 21 MG/DL (ref 6–20)
BUN/CREAT SERPL: 22 (ref 12–20)
CALCIUM SERPL-MCNC: 9.7 MG/DL (ref 8.5–10.1)
CHLORIDE SERPL-SCNC: 104 MMOL/L (ref 97–108)
CO2 SERPL-SCNC: 27 MMOL/L (ref 21–32)
CREAT SERPL-MCNC: 0.96 MG/DL (ref 0.55–1.02)
DIFFERENTIAL METHOD BLD: ABNORMAL
EOSINOPHIL # BLD: 0.06 K/UL (ref 0–0.4)
EOSINOPHIL NFR BLD: 0.9 % (ref 0–7)
ERYTHROCYTE [DISTWIDTH] IN BLOOD BY AUTOMATED COUNT: 13 % (ref 11.5–14.5)
EST. AVERAGE GLUCOSE BLD GHB EST-MCNC: 123 MG/DL
GLOBULIN SER CALC-MCNC: 3.5 G/DL (ref 2–4)
GLUCOSE SERPL-MCNC: 108 MG/DL (ref 65–100)
HBA1C MFR BLD: 5.9 % (ref 4–5.6)
HCT VFR BLD AUTO: 38.8 % (ref 35–47)
HGB BLD-MCNC: 12.9 G/DL (ref 11.5–16)
IMM GRANULOCYTES # BLD AUTO: 0.02 K/UL (ref 0–0.04)
IMM GRANULOCYTES NFR BLD AUTO: 0.3 % (ref 0–0.5)
LYMPHOCYTES # BLD: 1.62 K/UL (ref 0.8–3.5)
LYMPHOCYTES NFR BLD: 24.5 % (ref 12–49)
MCH RBC QN AUTO: 28.9 PG (ref 26–34)
MCHC RBC AUTO-ENTMCNC: 33.2 G/DL (ref 30–36.5)
MCV RBC AUTO: 86.8 FL (ref 80–99)
MONOCYTES # BLD: 0.5 K/UL (ref 0–1)
MONOCYTES NFR BLD: 7.6 % (ref 5–13)
NEUTS SEG # BLD: 4.39 K/UL (ref 1.8–8)
NEUTS SEG NFR BLD: 66.2 % (ref 32–75)
NRBC # BLD: 0 K/UL (ref 0–0.01)
NRBC BLD-RTO: 0 PER 100 WBC
PLATELET # BLD AUTO: 340 K/UL (ref 150–400)
PMV BLD AUTO: 8.8 FL (ref 8.9–12.9)
POTASSIUM SERPL-SCNC: 4.1 MMOL/L (ref 3.5–5.1)
PROT SERPL-MCNC: 7.8 G/DL (ref 6.4–8.2)
RBC # BLD AUTO: 4.47 M/UL (ref 3.8–5.2)
SODIUM SERPL-SCNC: 139 MMOL/L (ref 136–145)
WBC # BLD AUTO: 6.6 K/UL (ref 3.6–11)

## 2025-03-12 NOTE — PROGRESS NOTES
PA initiated for Wegovy 0.25mg/0.5ml. Denied. WE denied your request because you have not been taking part in a weight management program for at least 6 months. We reviewed the information and your request has been denied.

## 2025-03-15 ENCOUNTER — RESULTS FOLLOW-UP (OUTPATIENT)
Age: 65
End: 2025-03-15

## 2025-03-17 ENCOUNTER — TELEPHONE (OUTPATIENT)
Age: 65
End: 2025-03-17

## 2025-03-17 DIAGNOSIS — I10 ESSENTIAL (PRIMARY) HYPERTENSION: ICD-10-CM

## 2025-03-17 NOTE — TELEPHONE ENCOUNTER
[phone] Patient dropped off FMLA Form to be filled out.  Call patient when form is completed, she will  form.  Form was placed in provider's box.

## 2025-03-18 LAB
APPEARANCE UR: CLEAR
BACTERIA URNS QL MICRO: NEGATIVE /HPF
BILIRUB UR QL: NEGATIVE
COLOR UR: NORMAL
EPITH CASTS URNS QL MICRO: NORMAL /LPF
GLUCOSE UR STRIP.AUTO-MCNC: NEGATIVE MG/DL
HGB UR QL STRIP: NEGATIVE
HYALINE CASTS URNS QL MICRO: NORMAL /LPF (ref 0–5)
KETONES UR QL STRIP.AUTO: NEGATIVE MG/DL
LEUKOCYTE ESTERASE UR QL STRIP.AUTO: NEGATIVE
NITRITE UR QL STRIP.AUTO: NEGATIVE
PH UR STRIP: 8 (ref 5–8)
PROT UR STRIP-MCNC: NEGATIVE MG/DL
RBC #/AREA URNS HPF: NORMAL /HPF (ref 0–5)
SP GR UR REFRACTOMETRY: 1.02 (ref 1–1.03)
SPECIMEN HOLD: NORMAL
UROBILINOGEN UR QL STRIP.AUTO: 0.2 EU/DL (ref 0.2–1)
WBC URNS QL MICRO: NORMAL /HPF (ref 0–4)

## 2025-04-03 NOTE — TELEPHONE ENCOUNTER
Pt is wanting to get an update on her [facility] paperwork. She can be reached at [phone].

## 2025-05-12 ENCOUNTER — OFFICE VISIT (OUTPATIENT)
Age: 65
End: 2025-05-12
Payer: COMMERCIAL

## 2025-05-12 VITALS
TEMPERATURE: 98 F | BODY MASS INDEX: 33.6 KG/M2 | WEIGHT: 240 LBS | HEART RATE: 86 BPM | HEIGHT: 71 IN | SYSTOLIC BLOOD PRESSURE: 133 MMHG | DIASTOLIC BLOOD PRESSURE: 71 MMHG | OXYGEN SATURATION: 97 %

## 2025-05-12 DIAGNOSIS — R42 VERTIGO: ICD-10-CM

## 2025-05-12 DIAGNOSIS — I10 ESSENTIAL HYPERTENSION: Primary | ICD-10-CM

## 2025-05-12 DIAGNOSIS — I10 ESSENTIAL HYPERTENSION: ICD-10-CM

## 2025-05-12 PROCEDURE — 3078F DIAST BP <80 MM HG: CPT | Performed by: FAMILY MEDICINE

## 2025-05-12 PROCEDURE — 3075F SYST BP GE 130 - 139MM HG: CPT | Performed by: FAMILY MEDICINE

## 2025-05-12 PROCEDURE — 99213 OFFICE O/P EST LOW 20 MIN: CPT | Performed by: FAMILY MEDICINE

## 2025-05-12 NOTE — PROGRESS NOTES
Chief Complaint   Patient presents with    Follow-up     2 MONTH       \"Have you been to the ER, urgent care clinic since your last visit?  Hospitalized since your last visit?\"    NO    “Have you seen or consulted any other health care providers outside of [facility] since your last visit?”      ORTHO - DR. SHELIA OJEDA     “Have you had a pap smear?”    NO    Date of last Cervical Cancer screen (HPV or PAP): 2019     Pap smear results requested from Dr. LANIER Page    “Have you had a colorectal cancer screening such as a colonoscopy/FIT/Cologuard?    NO    Date of last Colonoscopy: 2014  No cologuard on file  No FIT/FOBT on file   No flexible sigmoidoscopy on file         Click Here for Release of Records Request       Vitals:    25 1525   BP: 133/71   Pulse: 86   Temp: 98 °F (36.7 °C)   SpO2: 97%      Health Maintenance Due   Topic Date Due    HIV screen  Never done    Cervical cancer screen  2024    Colorectal Cancer Screen  2024        The patient, Sita Bishop, identity was verified by name and .

## 2025-05-13 LAB
ANION GAP SERPL CALC-SCNC: 5 MMOL/L (ref 2–12)
BUN SERPL-MCNC: 28 MG/DL (ref 6–20)
BUN/CREAT SERPL: 21 (ref 12–20)
CALCIUM SERPL-MCNC: 9.9 MG/DL (ref 8.5–10.1)
CHLORIDE SERPL-SCNC: 101 MMOL/L (ref 97–108)
CO2 SERPL-SCNC: 34 MMOL/L (ref 21–32)
CREAT SERPL-MCNC: 1.34 MG/DL (ref 0.55–1.02)
GLUCOSE SERPL-MCNC: 117 MG/DL (ref 65–100)
POTASSIUM SERPL-SCNC: 3.5 MMOL/L (ref 3.5–5.1)
SODIUM SERPL-SCNC: 140 MMOL/L (ref 136–145)

## 2025-05-16 ENCOUNTER — RESULTS FOLLOW-UP (OUTPATIENT)
Age: 65
End: 2025-05-16

## 2025-05-30 RX ORDER — LEVOTHYROXINE SODIUM 100 UG/1
TABLET ORAL
Qty: 90 TABLET | Refills: 3 | Status: SHIPPED | OUTPATIENT
Start: 2025-05-30

## 2025-05-30 NOTE — TELEPHONE ENCOUNTER
Last appointment: 5/12/25  Next appointment: Advised to follow-up 11/12/25  Previous refill encounter(s): 5/20/24    Requested Prescriptions     Pending Prescriptions Disp Refills    levothyroxine (SYNTHROID) 100 MCG tablet [Pharmacy Med Name: LEVOTHYROXINE 100 MCG TABLET] 90 tablet 3     Sig: TAKE 1 TABLET BY MOUTH DAILY ON A EMPTY STOMACH         For Pharmacy Admin Tracking Only    Program: Medication Refill  CPA in place:    Recommendation Provided To:   Intervention Detail: New Rx: 1, reason: Patient Preference  Intervention Accepted By:   Gap Closed?:    Time Spent (min): 5

## 2025-06-16 RX ORDER — ATORVASTATIN CALCIUM 20 MG/1
TABLET, FILM COATED ORAL
Qty: 90 TABLET | Refills: 3 | Status: SHIPPED | OUTPATIENT
Start: 2025-06-16

## 2025-07-01 ENCOUNTER — CLINICAL DOCUMENTATION (OUTPATIENT)
Age: 65
End: 2025-07-01

## 2025-07-01 NOTE — PROGRESS NOTES
Form for [employer] [facility] faxed with confirmation to [phone].  Original left at  per Dr. Romero.

## 2025-07-08 ENCOUNTER — TELEPHONE (OUTPATIENT)
Age: 65
End: 2025-07-08

## 2025-07-16 ENCOUNTER — COMMUNITY OUTREACH (OUTPATIENT)
Age: 65
End: 2025-07-16